# Patient Record
Sex: FEMALE | Employment: OTHER | URBAN - METROPOLITAN AREA
[De-identification: names, ages, dates, MRNs, and addresses within clinical notes are randomized per-mention and may not be internally consistent; named-entity substitution may affect disease eponyms.]

---

## 2017-02-23 PROBLEM — Z79.4 TYPE 2 DIABETES MELLITUS WITHOUT COMPLICATION, WITH LONG-TERM CURRENT USE OF INSULIN (HCC): Status: ACTIVE | Noted: 2017-02-23

## 2017-02-23 PROBLEM — E11.9 TYPE 2 DIABETES MELLITUS WITHOUT COMPLICATION, WITH LONG-TERM CURRENT USE OF INSULIN (HCC): Status: ACTIVE | Noted: 2017-02-23

## 2017-08-15 ENCOUNTER — HOSPITAL ENCOUNTER (OUTPATIENT)
Dept: MAMMOGRAPHY | Age: 72
Discharge: HOME | End: 2017-08-15
Attending: FAMILY MEDICINE
Payer: MEDICARE

## 2017-08-15 DIAGNOSIS — Z12.39 SCREENING FOR MALIGNANT NEOPLASM OF BREAST: ICD-10-CM

## 2017-08-15 PROCEDURE — 77067 SCR MAMMO BI INCL CAD: CPT

## 2018-06-26 PROBLEM — E11.65 TYPE 2 DIABETES MELLITUS WITH HYPERGLYCEMIA, WITH LONG-TERM CURRENT USE OF INSULIN (HCC): Status: ACTIVE | Noted: 2017-02-23

## 2018-08-16 ENCOUNTER — HOSPITAL ENCOUNTER (OUTPATIENT)
Dept: MAMMOGRAPHY | Age: 73
Discharge: HOME | End: 2018-08-16
Attending: FAMILY MEDICINE
Payer: MEDICARE

## 2018-08-16 DIAGNOSIS — Z12.31 VISIT FOR SCREENING MAMMOGRAM: ICD-10-CM

## 2018-08-16 PROCEDURE — 77067 SCR MAMMO BI INCL CAD: CPT

## 2019-08-28 ENCOUNTER — HOSPITAL ENCOUNTER (OUTPATIENT)
Dept: MAMMOGRAPHY | Age: 74
Discharge: HOME | End: 2019-08-28
Attending: FAMILY MEDICINE
Payer: MEDICARE

## 2019-08-28 DIAGNOSIS — Z12.31 VISIT FOR SCREENING MAMMOGRAM: ICD-10-CM

## 2019-08-28 PROCEDURE — 77067 SCR MAMMO BI INCL CAD: CPT

## 2019-11-04 ENCOUNTER — HOSPITAL ENCOUNTER (OUTPATIENT)
Dept: MAMMOGRAPHY | Age: 74
Discharge: HOME | End: 2019-11-04
Attending: FAMILY MEDICINE
Payer: MEDICARE

## 2019-11-04 DIAGNOSIS — Z78.0 POSTMENOPAUSAL STATE: ICD-10-CM

## 2019-11-04 PROCEDURE — 77080 DXA BONE DENSITY AXIAL: CPT

## 2019-11-13 ENCOUNTER — HOSPITAL ENCOUNTER (OUTPATIENT)
Dept: SURGERY | Age: 74
Discharge: HOME | End: 2019-11-13

## 2019-11-14 VITALS — BODY MASS INDEX: 27.09 KG/M2 | WEIGHT: 138 LBS | HEIGHT: 60 IN

## 2019-11-14 NOTE — PERIOP NOTES
Patient verified name, , and procedure. Type: 1a; abbreviated assessment per anesthesia guidelines  Labs per surgeon: NONE  Labs per anesthesia: NONE    Instructed pt that they will be notified by the doctor office for time of arrival to GI lab. Follow diet and prep instructions per office including NPO status. If patient has NOT received instructions from office patient is advised to call surgeon office, verbalizes understanding. Bath or shower the night before and the am of surgery with non-mositurizing soap. No lotions, oils, powders, cologne on skin. No make up, eye make up or jewelry. Wear loose fitting comfortable, clean clothing. Must have adult present in building the entire time . Medications for the day of procedure none, patient to hold none    Will take 80% or 22 units of Solostar night before surgery per [facility] protocols. The following discharge instructions reviewed with patient: medication given during procedure may cause drowsiness for several hours, therefore, do not drive or operate machinery for remainder of the day. You may not drink alcohol on the day of your procedure, please resume regular diet and activity unless otherwise directed. You may experience abdominal distention for several hours that is relieved by the passage of gas. Contact your physician if you have any of the following: fever or chills, severe abdominal pain or excessive amount of bleeding or a large amount when having a bowel movement.  Occasional specks of blood with bowel movement would not be unusual.

## 2019-11-19 ENCOUNTER — ANESTHESIA EVENT (OUTPATIENT)
Dept: ENDOSCOPY | Age: 74
End: 2019-11-19
Payer: MEDICARE

## 2019-11-19 NOTE — ANESTHESIA PREPROCEDURE EVALUATION
Relevant Problems   No relevant active problems       Anesthetic History               Review of Systems / Medical History  Patient summary reviewed, nursing notes reviewed and pertinent labs reviewed    Pulmonary                   Neuro/Psych              Cardiovascular    Hypertension              Exercise tolerance: >4 METS     GI/Hepatic/Renal                Endo/Other    Diabetes: type 2, using insulin         Other Findings              Physical Exam    Airway  Mallampati: II  TM Distance: 4 - 6 cm  Neck ROM: decreased range of motion   Mouth opening: Normal     Cardiovascular    Rhythm: regular  Rate: normal    Murmur: Grade 3, Mitral area     Dental    Dentition: Lower partial plate and Upper partial plate     Pulmonary  Breath sounds clear to auscultation               Abdominal  GI exam deferred       Other Findings            Anesthetic Plan    ASA: 3  Anesthesia type: total IV anesthesia            Anesthetic plan and risks discussed with: Patient    Spouse and daughter present

## 2019-11-20 ENCOUNTER — HOSPITAL ENCOUNTER (OUTPATIENT)
Age: 74
Setting detail: OUTPATIENT SURGERY
Discharge: HOME | End: 2019-11-20
Attending: SURGERY | Admitting: SURGERY
Payer: MEDICARE

## 2019-11-20 ENCOUNTER — ANESTHESIA (OUTPATIENT)
Dept: ENDOSCOPY | Age: 74
End: 2019-11-20
Payer: MEDICARE

## 2019-11-20 VITALS
OXYGEN SATURATION: 97 % | DIASTOLIC BLOOD PRESSURE: 55 MMHG | HEART RATE: 52 BPM | RESPIRATION RATE: 14 BRPM | SYSTOLIC BLOOD PRESSURE: 110 MMHG

## 2019-11-20 LAB — GLUCOSE BLD STRIP.AUTO-MCNC: 146 MG/DL (ref 65–100)

## 2019-11-20 PROCEDURE — 74011250636 HC RX REV CODE- 250/636: Performed by: NURSE ANESTHETIST, CERTIFIED REGISTERED

## 2019-11-20 PROCEDURE — 74011250636 HC RX REV CODE- 250/636: Performed by: ANESTHESIOLOGY

## 2019-11-20 PROCEDURE — 88305 TISSUE EXAM BY PATHOLOGIST: CPT

## 2019-11-20 PROCEDURE — 76060000032 HC ANESTHESIA 0.5 TO 1 HR: Performed by: SURGERY

## 2019-11-20 PROCEDURE — 82962 GLUCOSE BLOOD TEST: CPT

## 2019-11-20 PROCEDURE — 77030009426 HC FCPS BIOP ENDOSC BSC -B: Performed by: SURGERY

## 2019-11-20 PROCEDURE — 77030013991 HC SNR POLYP ENDOSC BSC -A: Performed by: SURGERY

## 2019-11-20 PROCEDURE — 76040000026: Performed by: SURGERY

## 2019-11-20 RX ORDER — SODIUM CHLORIDE 0.9 % (FLUSH) 0.9 %
5-40 SYRINGE (ML) INJECTION AS NEEDED
Status: DISCONTINUED | OUTPATIENT
Start: 2019-11-20 | End: 2019-11-20 | Stop reason: HOSPADM

## 2019-11-20 RX ORDER — SODIUM CHLORIDE, SODIUM LACTATE, POTASSIUM CHLORIDE, CALCIUM CHLORIDE 600; 310; 30; 20 MG/100ML; MG/100ML; MG/100ML; MG/100ML
100 INJECTION, SOLUTION INTRAVENOUS CONTINUOUS
Status: DISCONTINUED | OUTPATIENT
Start: 2019-11-20 | End: 2019-11-20 | Stop reason: HOSPADM

## 2019-11-20 RX ORDER — PROPOFOL 10 MG/ML
INJECTION, EMULSION INTRAVENOUS AS NEEDED
Status: DISCONTINUED | OUTPATIENT
Start: 2019-11-20 | End: 2019-11-20 | Stop reason: HOSPADM

## 2019-11-20 RX ORDER — SODIUM CHLORIDE 0.9 % (FLUSH) 0.9 %
5-40 SYRINGE (ML) INJECTION EVERY 8 HOURS
Status: DISCONTINUED | OUTPATIENT
Start: 2019-11-20 | End: 2019-11-20 | Stop reason: HOSPADM

## 2019-11-20 RX ORDER — PROPOFOL 10 MG/ML
INJECTION, EMULSION INTRAVENOUS
Status: DISCONTINUED | OUTPATIENT
Start: 2019-11-20 | End: 2019-11-20 | Stop reason: HOSPADM

## 2019-11-20 RX ADMIN — PROPOFOL 80 MG: 10 INJECTION, EMULSION INTRAVENOUS at 12:30

## 2019-11-20 RX ADMIN — PROPOFOL 180 MCG/KG/MIN: 10 INJECTION, EMULSION INTRAVENOUS at 12:30

## 2019-11-20 RX ADMIN — SODIUM CHLORIDE, SODIUM LACTATE, POTASSIUM CHLORIDE, AND CALCIUM CHLORIDE: 600; 310; 30; 20 INJECTION, SOLUTION INTRAVENOUS at 12:25

## 2019-11-20 NOTE — DISCHARGE INSTRUCTIONS
Gastrointestinal Colonoscopy/Flexible Sigmoidoscopy  Lower Exam Discharge Instructions      1. Call your doctor for any problems or questions. Amina Channel a castrejon doctor para cualquier problema  ó preguntas  2. Contact the doctors office for follow up appointment as directed  Contacte a la oficina del doctor para hacer nish visita de seguimiento omero se lo indicaron. 3. Medication may cause drowsiness for several hours, therefore, do not drive or operate machinery for remainder of the day. [employer] puede causar sueño por varias horas, por lo tanto no conducir y tampoco operar maquinaria el flori del día. 4. No alcohol today. No consumir bebidas alcoholicas. 5. Ordinarily, you may resume regular diet and activity after exam unless otherwise specified by your physician. Puede regresar a castrejon dieta regular despues del procedimiento al [employer] doctor se lo haya especificado. 6. Because of air put into your colon during exam, you may experience some abdominal distension, relieved by the passage of gas, for several hours. Debido al aire que pusimos en el colon para el procedimiento, usted experimentará el abdomen extendido para aliviar [place] problema debe pasar los gases por varias horas. 7. Contact your physician if you have any of the following:  Comuniquese con el doctor si presenta estos sintomas. a. Excessive amount of bleeding - large amount when having a bowel movement. Occasional specks of blood with bowel movement would not be unusual.  Sangrado excesivo cuando va al baño. No sería inusual si tiene manchas de suzette con la defecación.  b. Severe abdominal pain  Dolor abdominal severo  c.  Fever or Chills  Tammy Severino ó escalofrios

## 2019-11-20 NOTE — H&P
History and Physical      Patient: Kristan Britt    Physician: Shea Chew MD    Referring Physician: Geovanni Bond MD    Chief Complaint: For colonoscopy    History of Present Illness: Pt presents for colonoscopy. Per , previous exam was 3767 but family uncertain of presence of polyps. 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, implant or graft     premacular fibrosis    History of bilateral cataract extraction     History of glaucoma     Hyperlipidemia     Hypertension      Past Surgical History:   Procedure Laterality Date    HX APPENDECTOMY      HX CATARACT REMOVAL      HX  SECTION      HX OTHER SURGICAL      chronic drop therapy    HX REFRACTIVE SURGERY        Social History     Socioeconomic History    Marital status:      Spouse name: Not on file    Number of children: Not on file    Years of education: Not on file    Highest education level: Not on file   Tobacco Use    Smoking status: Never Smoker    Smokeless tobacco: Never Used   Substance and Sexual Activity    Alcohol use: No     Alcohol/week: 0.0 standard drinks    Drug use: No   Other Topics Concern    Seat Belt Yes   Social History Narrative    Denies physical or sexual abuse      Family History   Problem Relation Age of Onset    Heart Disease Mother     Diabetes Father     Other Brother         bone disease    Heart Attack Brother     Breast Cancer Neg Hx        Medications:   Prior to Admission medications    Medication Sig Start Date End Date Taking? Authorizing Provider   insulin glargine (LANTUS SOLOSTAR U-100 INSULIN) 100 unit/mL (3 mL) inpn 30 Units by SubCUTAneous route daily.  19   Geovanni Bond MD   insulin aspart U-100 (NOVOLOG FLEXPEN U-100 INSULIN) 100 unit/mL (3 mL) inpn INYECTAR 8 UNIDADES POR VIA SUBCUTANEA EN [facility] Y 16 UNIDADES CON LA ROSIE 11/14/19   Garry Williamosn MD   lancets (PRODIGY LANCETS) 28 gauge misc Use to test blood sugar daily. Dx: DM2, E11.9  Indications: type 2 diabetes mellitus 7/25/19   Garry Williamson MD   dulaglutide (TRULICITY) 1.5 ER/8.4 mL sub-q pen 0.5 mL by SubCUTAneous route every seven (7) days. 7/3/19   Garry Williamson MD   simvastatin (ZOCOR) 20 mg tablet [facility] [address]  Patient taking differently: every evening. 7/1/19   Garry Williamson MD   losartan-hydroCHLOROthiazide [facility]) 100-25 mg per tablet [facility] [address] [facility]  Patient taking differently: Take 1 Tab by mouth daily. 7/1/19   Garry Williamson MD   metFORMIN (GLUCOPHAGE) 1,000 mg tablet TAKE ONE TABLET BY MOUTH TWICE A DAY WITH MEALS 7/1/19   Garry Williamson MD   ketoconazole (NIZORAL) 2 % shampoo Apply 120 mL to affected area two (2) days a week for 90 days. 1/19/19   Garry Williamson MD   Blood-Glucose Meter (ACCU-CHEK JASWANT PLUS METER) misc USE TO CHECK BLOOD SUGAR LEVEL TID/DX E11.9 10/1/18   Garry Williamson MD   ACCU-CHEK JASWANT CONTROL SOLN soln Use to test blood sugar daily. Dx: DM2, E11.9 10/1/18   Garry Williamson MD   glucose blood VI test strips (ACCU-CHEK JASWANT PLUS TEST STRP) strip Use to use test blood sugars three times daily. 10/1/18   Garry Williamson MD   alcohol swabs (BD SINGLE USE SWABS REGULAR) padm Use to test blood sugar daily. Dx: DM2, E11.9 7/20/16   Garry Williamson MD   aspirin delayed-release 81 mg tablet Take 81 mg by mouth daily. Indications: MYOCARDIAL INFARCTION PREVENTION    Provider, Historical   Calcium-Cholecalciferol, D3, (CALCIUM 600 WITH VITAMIN D3) 600 mg(1,500mg) -400 unit cap Take  by mouth. Provider, Historical   Insulin Needles, Disposable, (NOVOFINE 32) 32 x 1/4 \" ndle Use to test blood sugar daily. Dx: DM2, E11.9    Provider, Historical       Allergies: No Known Allergies    Physical Exam:     Vital Signs:  There were no vitals taken for this visit.  .    General: no distress      Heart: regular   Lungs: unlabored   Abdominal: soft   Neurological: Grossly normal        Findings/Diagnosis: Screening for colorectal cancer     Plan of Care/Planned Procedure: Colonoscopy, possible polypectomy. Pt/designee has reviewed the colonoscopy information sheet. Any questions have been discussed. They agree to proceed.       Signed:  Marcela Gimenez MD   11/20/2019

## 2019-11-20 NOTE — PROCEDURES
Procedure in Detail:  Informed consent was obtained for the procedure. The patient was placed in the left lateral decubitus position and sedation was induced by anesthesia. The scope was inserted into the rectum and advanced under direct vision to the cecum, which was identified by the ileocecal valve and appendiceal orifice. The quality of the colonic preparation was adequate- there was copious residual slurry-type material requiring lavage, and distension was not ideal in addition to redundancy of the sigmoid. A careful inspection was made as the colonoscope was withdrawn, including a retroflexed view of the rectum; findings and interventions are described below. Appropriate photodocumentation was obtained. Findings:   Rectum:   Normal  Sigmoid:     - Protruding lesions:     -bilobed 5mm and sessile rectosigmoid lesion Polyp(s) size 4 mm removed by polypectomy (snare cautery) and polypectomy (hot biopsy)  Descending Colon:   Normal  Transverse Colon:   Normal  Ascending Colon:   Normal  Cecum:     - Protruding lesions:     -Sessile Polyp(s) size 3 mm removed by polypectomy (hot biopsy)          Specimens: Specimens were collected and sent to pathology. Complications: None; patient tolerated the procedure well. \    EBL - none    Recommendations:   - Await pathology.      Signed By: Celi Larose MD                        November 20, 2019

## 2019-11-20 NOTE — ANESTHESIA POSTPROCEDURE EVALUATION
Procedure(s):  COLONOSCOPY/ BMI 25   ENDOSCOPIC POLYPECTOMY.     total IV anesthesia    Anesthesia Post Evaluation        Patient location during evaluation: PACU  Patient participation: complete - patient participated  Level of consciousness: responsive to verbal stimuli  Pain management: adequate  Airway patency: patent  Anesthetic complications: no  Cardiovascular status: acceptable  Respiratory status: acceptable  Hydration status: euvolemic        Vitals Value Taken Time   /55 11/20/2019  1:20 PM   Temp     Pulse 51 11/20/2019  1:20 PM   Resp 14 11/20/2019  1:20 PM   SpO2 98 % 11/20/2019  1:20 PM

## 2019-11-20 NOTE — ROUTINE PROCESS
Patient refused hospital . Present for Signing of waiver with nurse. Daughter will be interpreting. Brandie Prakash CH[employer]//  Patient Relations & Interpreting Services 
p: [phone] / [email or web address]

## 2019-11-26 NOTE — PROGRESS NOTES
Pleasant surprise- all 3 polyps insignificant.   No further screening based on age  plz call to daughter Elizabet Force [phone]

## 2022-03-19 PROBLEM — E11.65 TYPE 2 DIABETES MELLITUS WITH HYPERGLYCEMIA, WITH LONG-TERM CURRENT USE OF INSULIN (HCC): Status: ACTIVE | Noted: 2017-02-23

## 2022-03-19 PROBLEM — Z79.4 TYPE 2 DIABETES MELLITUS WITH HYPERGLYCEMIA, WITH LONG-TERM CURRENT USE OF INSULIN (HCC): Status: ACTIVE | Noted: 2017-02-23

## 2022-04-11 ENCOUNTER — HOSPITAL ENCOUNTER (OUTPATIENT)
Dept: CT IMAGING | Age: 77
Discharge: HOME | End: 2022-04-11
Attending: FAMILY MEDICINE
Payer: MEDICARE

## 2022-04-11 ENCOUNTER — HOSPITAL ENCOUNTER (OUTPATIENT)
Dept: ULTRASOUND IMAGING | Age: 77
Discharge: HOME | End: 2022-04-11
Attending: FAMILY MEDICINE
Payer: MEDICARE

## 2022-04-11 DIAGNOSIS — R31.9 HEMATURIA, UNSPECIFIED TYPE: ICD-10-CM

## 2022-04-11 PROCEDURE — 74176 CT ABD & PELVIS W/O CONTRAST: CPT

## 2022-04-11 PROCEDURE — 76770 US EXAM ABDO BACK WALL COMP: CPT

## 2022-04-20 ENCOUNTER — TRANSCRIBE ORDER (OUTPATIENT)
Dept: SCHEDULING | Age: 77
End: 2022-04-20

## 2022-04-20 DIAGNOSIS — Z12.31 OTHER SCREENING MAMMOGRAM: Primary | ICD-10-CM

## 2022-05-16 ENCOUNTER — HOSPITAL ENCOUNTER (OUTPATIENT)
Dept: MAMMOGRAPHY | Age: 77
Discharge: HOME | End: 2022-05-16
Attending: FAMILY MEDICINE
Payer: MEDICARE

## 2022-05-16 DIAGNOSIS — Z13.820 SCREENING FOR OSTEOPOROSIS: ICD-10-CM

## 2022-05-16 DIAGNOSIS — Z78.0 POSTMENOPAUSAL: ICD-10-CM

## 2022-05-16 DIAGNOSIS — Z12.31 OTHER SCREENING MAMMOGRAM: ICD-10-CM

## 2022-05-16 PROCEDURE — 77067 SCR MAMMO BI INCL CAD: CPT

## 2022-05-16 PROCEDURE — 77080 DXA BONE DENSITY AXIAL: CPT

## 2022-06-03 ENCOUNTER — TELEPHONE (OUTPATIENT)
Dept: UROLOGY | Age: 77
End: 2022-06-03

## 2022-06-03 ENCOUNTER — OFFICE VISIT (OUTPATIENT)
Dept: UROLOGY | Age: 77
End: 2022-06-03
Payer: MEDICARE

## 2022-06-03 DIAGNOSIS — N39.0 RECURRENT UTI: Primary | ICD-10-CM

## 2022-06-03 LAB
BILIRUBIN, URINE, POC: NEGATIVE
BLOOD URINE, POC: NORMAL
GLUCOSE URINE, POC: NEGATIVE
KETONES, URINE, POC: NEGATIVE
LEUKOCYTE ESTERASE, URINE, POC: NEGATIVE
NITRITE, URINE, POC: NEGATIVE
PH, URINE, POC: 7 (ref 4.6–8)
PROTEIN,URINE, POC: NEGATIVE
SPECIFIC GRAVITY, URINE, POC: 1.02 (ref 1–1.03)
URINALYSIS CLARITY, POC: NORMAL
URINALYSIS COLOR, POC: NORMAL
UROBILINOGEN, POC: NORMAL

## 2022-06-03 PROCEDURE — 81003 URINALYSIS AUTO W/O SCOPE: CPT | Performed by: UROLOGY

## 2022-06-03 NOTE — PROGRESS NOTES
Reason for collection: recheck  Patient #: [phone]  Pharmacy: [address]              UA - Dipstick  Results for orders placed or performed in visit on 06/03/22   AMB POC URINALYSIS DIP STICK AUTO W/O MICRO   Result Value Ref Range    Color (UA POC)      Clarity (UA POC)      Glucose, Urine, POC Negative Negative    Bilirubin, Urine, POC Negative Negative    KETONES, Urine, POC Negative Negative    Specific Gravity, Urine, POC 1.020 1.001 - 1.035    Blood (UA POC) Large Negative    pH, Urine, POC 7.0 4.6 - 8.0    Protein, Urine, POC Negative Negative    Urobilinogen, POC 0.2 mg/dL     Nitrite, Urine, POC Negative Negative    Leukocyte Esterase, Urine, POC Negative Negative       UA - Micro  WBC - 0  RBC -25-30  Bacteria - 0  Epith - 0      Requested Prescriptions      No prescriptions requested or ordered in this encounter     Plan    Diagnosis Orders   1. Recurrent UTI  AMB POC URINALYSIS DIP STICK AUTO W/O MICRO     Had Staph UTI on 5-13-22. Not sure she ever got abx. Still has microhematuria   Needs cysto. Can use cysto, trus or vas slot.

## 2022-06-07 ENCOUNTER — TELEPHONE (OUTPATIENT)
Dept: FAMILY MEDICINE CLINIC | Facility: CLINIC | Age: 77
End: 2022-06-07

## 2022-06-07 NOTE — TELEPHONE ENCOUNTER
Pt. Has come to the office to req. A referral to be sent to a Dr. Sonia Cook (urologist with [facility]) instead of previous physician.       Please call patient with the status of the referral

## 2022-06-08 ENCOUNTER — TELEPHONE (OUTPATIENT)
Dept: FAMILY MEDICINE CLINIC | Facility: CLINIC | Age: 77
End: 2022-06-08

## 2022-06-08 DIAGNOSIS — R31.9 HEMATURIA, UNSPECIFIED TYPE: Primary | ICD-10-CM

## 2022-06-08 NOTE — TELEPHONE ENCOUNTER
Patient does not want to see a [facility] urologist wants to go to [facility]

## 2022-06-20 NOTE — TELEPHONE ENCOUNTER
Daughter called back and states pt's  has appt with Bárbara Dowell today so he will schedule her procedure when he comes in.

## 2022-06-27 ENCOUNTER — PROCEDURE VISIT (OUTPATIENT)
Dept: UROLOGY | Age: 77
End: 2022-06-27
Payer: MEDICARE

## 2022-06-27 DIAGNOSIS — N39.0 RECURRENT UTI: ICD-10-CM

## 2022-06-27 DIAGNOSIS — R31.29 MICROHEMATURIA: Primary | ICD-10-CM

## 2022-06-27 LAB
BILIRUBIN, URINE, POC: NEGATIVE
BLOOD URINE, POC: NEGATIVE
GLUCOSE URINE, POC: NEGATIVE
KETONES, URINE, POC: NEGATIVE
LEUKOCYTE ESTERASE, URINE, POC: NEGATIVE
NITRITE, URINE, POC: NEGATIVE
PH, URINE, POC: 6 (ref 4.6–8)
PROTEIN,URINE, POC: NEGATIVE
SPECIFIC GRAVITY, URINE, POC: 1.02 (ref 1–1.03)
URINALYSIS CLARITY, POC: NORMAL
URINALYSIS COLOR, POC: NORMAL
UROBILINOGEN, POC: NORMAL

## 2022-06-27 PROCEDURE — 52000 CYSTOURETHROSCOPY: CPT | Performed by: UROLOGY

## 2022-06-27 PROCEDURE — 81003 URINALYSIS AUTO W/O SCOPE: CPT | Performed by: UROLOGY

## 2022-06-27 NOTE — LETTER
[facility] UROLOGY  [address] [place] [address]  Phone: [phone]  Fax: [phone]    Mathew Meneses MD    June 27, 2022     Haresh Lan DO  [address] [place] [address]    Patient: Artis Ornelas   MR Number: [id number]   YOB: 1945   Date of Visit: 6/27/2022       Dear Haresh Lan:    Thank you for referring Artis Ornelas to me for evaluation/treatment. Below are the relevant portions of my assessment and plan of care. If you have questions, please do not hesitate to call me. I look forward to following Rubi Stroud along with you.     Sincerely,      Mathew Meneses MD

## 2022-06-27 NOTE — PROGRESS NOTES
[facility]  [address] [facility]   [address]  [address] [facility], [address] [id number]    [facility]  : 1945         HPI   76 y.o., female referred by Dr. Anisha Felipe for evaluation and treatment of hematuria. UA on 3-25-22 showed 2+ blood. Urine culture showed no growth. CT urogram 22: IMPRESSION  1. Constipation. No bowel obstruction or diverticulitis. Appendix not  visualized. 2. No urinary tract calculi or hydronephrosis.  service used today (Swedish). Pt denies dysuria.  has seen her have gross hematuria 2 or 3 times. Pt denies flank pain or abdomen. Renal US 22:    IMPRESSION  Unremarkable renal ultrasound. No hydronephrosis or obvious renal  calculi. Incidental fatty liver infiltration.   pt is nonsmoker.    staph aureus UTI when seen on 22. Still had microhematuria when seen on 6-3-22. Here for cysto.       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, implant or graft     premacular fibrosis    History of bilateral cataract extraction     History of glaucoma     Hyperlipidemia     Hypertension      Past Surgical History:   Procedure Laterality Date    APPENDECTOMY      CATARACT REMOVAL       SECTION      COLONOSCOPY N/A 2019    COLONOSCOPY/ BMI 25  performed by Kasi Palacios [facility] at [facility] 40 FLX W/RMVL OF TUMOR POLYP LESION SNARE TQ  2019         OTHER SURGICAL HISTORY      chronic drop therapy    REFRACTIVE SURGERY       Current Outpatient Medications   Medication Sig Dispense Refill    aspirin 81 MG EC tablet Take 81 mg by mouth daily      Calcium Carb-Cholecalciferol 600-400 MG-UNIT CAPS 1 p.o. daily      Dulaglutide (TRULICITY) 1.5 UI/5.1YM SOPN Inject 1.5 mg into the skin every 7 days      insulin aspart (NOVOLOG) 100 UNIT/ML injection pen Patient will do 8 units in the morning with breakfast 12 units at lunch and 20 units with dinner      insulin glargine (LANTUS SOLOSTAR) 100 UNIT/ML injection pen Inject 50 Units into the skin      ketoconazole (NIZORAL) 2 % shampoo Apply 120 mLs topically      losartan-hydroCHLOROthiazide (HYZAAR) 100-25 MG per tablet TOME 1 TABLETA DIARIAMENTE      metFORMIN (GLUCOPHAGE) 1000 MG tablet TAKE ONE TABLET BY MOUTH TWICE A DAY WITH MEALS      simvastatin (ZOCOR) 20 MG tablet TOME 1 TABLETA CADA NOCHE PARA HIPERCOLESTEROLEMIA       No current facility-administered medications for this visit. No Known Allergies  Social History     Socioeconomic History    Marital status:      Spouse name: Not on file    Number of children: Not on file    Years of education: Not on file    Highest education level: Not on file   Occupational History    Not on file   Tobacco Use    Smoking status: Never Smoker    Smokeless tobacco: Never Used   Substance and Sexual Activity    Alcohol use: No     Alcohol/week: 0.0 standard drinks    Drug use: No    Sexual activity: Not on file   Other Topics Concern    Not on file   Social History Narrative    Denies physical or sexual abuse     Social Determinants of Health     Financial Resource Strain:     Difficulty of Paying Living Expenses: Not on file   Food Insecurity:     Worried About Running Out of Food in the Last Year: Not on file    [place] of Food in the Last Year: Not on file   Transportation Needs:     Lack of Transportation (Medical): Not on file    Lack of Transportation (Non-Medical):  Not on file   Physical Activity:     Days of Exercise per Week: Not on file    Minutes of Exercise per Session: Not on file   Stress:     Feeling of Stress : Not on file   Social Connections:     Frequency of Communication with Friends and Family: Not on file    Frequency of Social Gatherings with Friends and Family: Not on file    Attends Rastafari Services: Not on file    Active Member of Clubs or Organizations: Not on file    Attends Club or Organization Meetings: Not on file    Marital Status: Not on file   Intimate Partner Violence:     Fear of Current or Ex-Partner: Not on file    Emotionally Abused: Not on file    Physically Abused: Not on file    Sexually Abused: Not on file   Housing Stability:     Unable to Pay for Housing in the Last Year: Not on file    Number of [place] in the Last Year: Not on file    Unstable Housing in the Last Year: Not on file     Family History   Problem Relation Age of Onset    Heart Disease Mother     Diabetes Mother     Diabetes Father     Other Brother         bone disease    Heart Attack Brother     Breast Cancer Neg Hx        There were no vitals taken for this visit. UA - Dipstick  Results for orders placed or performed in visit on 06/27/22   AMB POC URINALYSIS DIP STICK AUTO W/O MICRO   Result Value Ref Range    Color (UA POC)      Clarity (UA POC)      Glucose, Urine, POC Negative Negative    Bilirubin, Urine, POC Negative Negative    KETONES, Urine, POC Negative Negative    Specific Gravity, Urine, POC 1.020 1.001 - 1.035    Blood (UA POC) Negative Negative    pH, Urine, POC 6.0 4.6 - 8.0    Protein, Urine, POC Negative Negative    Urobilinogen, POC Normal     Nitrite, Urine, POC Negative Negative    Leukocyte Esterase, Urine, POC Negative Negative       UA - Micro  WBC - 0  RBC - 0  Bacteria - 0  Epith - 0      Cystoscopy Procedure:     Procedure Room  3  Scope ID:       dispo  Assistant:      sarah    All risks, benefits and alternatives were again reviewed with patient and she is willing to proceed at this time. Her genital area was prepped and draped and a sterile field applied. 2% lidocaine jelly was injected in the the urethra and allowed to dwell for several minutes. A flexible cystoscope was then inserted into the urethral meatus and advanced under direct vision.   The urethra appeared normal with no obstructions. The bladder neck was open without scarring, contractures, frons or tumors present. The bladder was systematically surveyed. No bladder trabeculations were seen. No mucosal abnormalities were seen. The ureteral orifices were seen in their normal orthotopic position. The cystoscope was then removed under direct vision. The patient tolerated the procedure well. Assessment and Plan    ICD-10-CM    1. Microhematuria  R31.29 CYSTOURETHROSCOPY     AMB POC URINALYSIS DIP STICK AUTO W/O MICRO   2. Recurrent UTI  N39.0      Orders Placed This Encounter   Procedures    CYSTOURETHROSCOPY    AMB POC URINALYSIS DIP STICK AUTO W/O MICRO     Hematuria resolved. May have been due to UTI. Return if symptoms worsen or fail to improve.

## 2022-09-16 ENCOUNTER — NURSE ONLY (OUTPATIENT)
Dept: FAMILY MEDICINE CLINIC | Facility: CLINIC | Age: 77
End: 2022-09-16
Payer: MEDICARE

## 2022-09-16 DIAGNOSIS — Z79.4 TYPE 2 DIABETES MELLITUS WITH HYPERGLYCEMIA, WITH LONG-TERM CURRENT USE OF INSULIN (HCC): Primary | ICD-10-CM

## 2022-09-16 DIAGNOSIS — I10 ESSENTIAL HYPERTENSION WITH GOAL BLOOD PRESSURE LESS THAN 140/90: ICD-10-CM

## 2022-09-16 DIAGNOSIS — E78.00 PURE HYPERCHOLESTEROLEMIA: ICD-10-CM

## 2022-09-16 DIAGNOSIS — E11.65 TYPE 2 DIABETES MELLITUS WITH HYPERGLYCEMIA, WITH LONG-TERM CURRENT USE OF INSULIN (HCC): Primary | ICD-10-CM

## 2022-09-16 LAB
ALBUMIN SERPL-MCNC: 4 G/DL (ref 3.2–4.6)
ALBUMIN/GLOB SERPL: 1.2 {RATIO} (ref 1.2–3.5)
ALP SERPL-CCNC: 67 U/L (ref 50–136)
ALT SERPL-CCNC: 46 U/L (ref 12–65)
ANION GAP SERPL CALC-SCNC: 5 MMOL/L (ref 4–13)
AST SERPL-CCNC: 26 U/L (ref 15–37)
BILIRUB SERPL-MCNC: 0.4 MG/DL (ref 0.2–1.1)
BUN SERPL-MCNC: 22 MG/DL (ref 8–23)
CALCIUM SERPL-MCNC: 10.1 MG/DL (ref 8.3–10.4)
CHLORIDE SERPL-SCNC: 100 MMOL/L (ref 101–110)
CHOLEST SERPL-MCNC: 142 MG/DL
CO2 SERPL-SCNC: 31 MMOL/L (ref 21–32)
CREAT SERPL-MCNC: 0.6 MG/DL (ref 0.6–1)
EST. AVERAGE GLUCOSE BLD GHB EST-MCNC: 163 MG/DL
GLOBULIN SER CALC-MCNC: 3.4 G/DL (ref 2.3–3.5)
GLUCOSE SERPL-MCNC: 121 MG/DL (ref 65–100)
HBA1C MFR BLD: 7.3 % (ref 4.8–5.6)
HDLC SERPL-MCNC: 59 MG/DL (ref 40–60)
HDLC SERPL: 2.4 {RATIO}
LDLC SERPL CALC-MCNC: 42.8 MG/DL
POTASSIUM SERPL-SCNC: 3.9 MMOL/L (ref 3.5–5.1)
PROT SERPL-MCNC: 7.4 G/DL (ref 6.3–8.2)
SODIUM SERPL-SCNC: 136 MMOL/L (ref 136–145)
TRIGL SERPL-MCNC: 201 MG/DL (ref 35–150)
VLDLC SERPL CALC-MCNC: 40.2 MG/DL (ref 6–23)

## 2022-09-16 PROCEDURE — 36415 COLL VENOUS BLD VENIPUNCTURE: CPT | Performed by: FAMILY MEDICINE

## 2022-09-22 ENCOUNTER — TELEMEDICINE (OUTPATIENT)
Dept: FAMILY MEDICINE CLINIC | Facility: CLINIC | Age: 77
End: 2022-09-22
Payer: MEDICARE

## 2022-09-22 DIAGNOSIS — Z00.00 ROUTINE GENERAL MEDICAL EXAMINATION AT A HEALTH CARE FACILITY: ICD-10-CM

## 2022-09-22 DIAGNOSIS — Z79.4 TYPE 2 DIABETES MELLITUS WITH HYPERGLYCEMIA, WITH LONG-TERM CURRENT USE OF INSULIN (HCC): Primary | ICD-10-CM

## 2022-09-22 DIAGNOSIS — E11.65 TYPE 2 DIABETES MELLITUS WITH HYPERGLYCEMIA, WITH LONG-TERM CURRENT USE OF INSULIN (HCC): Primary | ICD-10-CM

## 2022-09-22 PROCEDURE — 99442 PR PHYS/QHP TELEPHONE EVALUATION 11-20 MIN: CPT | Performed by: FAMILY MEDICINE

## 2022-09-22 RX ORDER — DULAGLUTIDE 1.5 MG/.5ML
1.5 INJECTION, SOLUTION SUBCUTANEOUS
Qty: 12 ADJUSTABLE DOSE PRE-FILLED PEN SYRINGE | Refills: 5 | Status: SHIPPED | OUTPATIENT
Start: 2022-09-22

## 2022-09-22 RX ORDER — INSULIN GLARGINE 100 [IU]/ML
50 INJECTION, SOLUTION SUBCUTANEOUS NIGHTLY
Qty: 5 ADJUSTABLE DOSE PRE-FILLED PEN SYRINGE | Refills: 5 | Status: SHIPPED | OUTPATIENT
Start: 2022-09-22

## 2022-09-22 ASSESSMENT — ENCOUNTER SYMPTOMS
VOMITING: 0
SHORTNESS OF BREATH: 0
NAUSEA: 0

## 2022-09-22 NOTE — PROGRESS NOTES
PROGRESS NOTE  This visit was conducted via the phone with patient's consent to the visit and all associated charges to reduce the patient's risk of exposure to COVID-19 and continuity of care for an established patient. She and/or her healthcare decision maker is aware that this patient-initiated phone encounter is a billable service, with coverage as determined by her insurance carrier. She is aware that she may receive a bill and has provided verbal consent to proceed: Yes    Vitals and physical exam deferred due to telephone visit. Total Time: minutes: 11-20 minutes. SUBJECTIVE:   Lexis Salmeron is a 76 y.o. female seen for a follow up visit regarding the following chief complaint:           HPI patient is doing a phone call visit to go over lab results and refill prescriptions without any new complaints      Past Medical History, Past Surgical History, Family history, Social History, and Medications were all reviewed with the patient today and updated as necessary.        Current Outpatient Medications   Medication Sig Dispense Refill    Dulaglutide (TRULICITY) 1.5 JV/5.0EO SOPN Inject 1.5 mg into the skin every 7 days 12 Adjustable Dose Pre-filled Pen Syringe 5    insulin aspart (NOVOLOG) 100 UNIT/ML injection pen Patient will do 8 units in the morning with breakfast 12 units at lunch and 20 units with dinner 5 Adjustable Dose Pre-filled Pen Syringe 5    insulin glargine (LANTUS SOLOSTAR) 100 UNIT/ML injection pen Inject 50 Units into the skin nightly 5 Adjustable Dose Pre-filled Pen Syringe 5    aspirin 81 MG EC tablet Take 81 mg by mouth daily      Calcium Carb-Cholecalciferol 600-400 MG-UNIT CAPS 1 p.o. daily      ketoconazole (NIZORAL) 2 % shampoo Apply 120 mLs topically      losartan-hydroCHLOROthiazide (HYZAAR) 100-25 MG per tablet TOME 1 TABLETA DIARIAMENTE      metFORMIN (GLUCOPHAGE) 1000 MG tablet TAKE ONE TABLET BY MOUTH TWICE A DAY WITH MEALS      simvastatin (ZOCOR) 20 MG tablet TOME 1 CAROL VAZQUEZ [address] [place] [address]       No current facility-administered medications for this visit. 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, implant or graft     premacular fibrosis    History of bilateral cataract extraction     History of glaucoma     Hyperlipidemia     Hypertension      Past Surgical History:   Procedure Laterality Date    APPENDECTOMY      CATARACT REMOVAL       SECTION      COLONOSCOPY N/A 2019    COLONOSCOPY/ BMI 22  performed by Chava Lainez MD at [address] [place] [address] FLX W/RMVL OF TUMOR POLYP LESION SNARE TQ  2019         OTHER SURGICAL HISTORY      chronic drop therapy    REFRACTIVE SURGERY       Family History   Problem Relation Age of Onset    Heart Disease Mother     Diabetes Mother     Diabetes Father     Other Brother         bone disease    Heart Attack Brother     Breast Cancer Neg Hx      Social History     Tobacco Use    Smoking status: Never    Smokeless tobacco: Never   Substance Use Topics    Alcohol use: No     Alcohol/week: 0.0 standard drinks         Review of Systems   Constitutional:  Negative for fatigue and fever. Respiratory:  Negative for shortness of breath. Cardiovascular:  Negative for chest pain. Gastrointestinal:  Negative for nausea and vomiting. OBJECTIVE:  There were no vitals taken for this visit. Physical Exam     Medical problems and test results were reviewed with the patient today.      Recent Results (from the past 672 hour(s))   Hemoglobin A1C    Collection Time: 22 11:02 AM   Result Value Ref Range    Hemoglobin A1C 7.3 (H) 4.8 - 5.6 %    eAG 163 mg/dL   Comprehensive Metabolic Panel    Collection Time: 09/16/22 11:02 AM   Result Value Ref Range    Sodium 136 136 - 145 mmol/L    Potassium 3.9 3.5 - 5.1 mmol/L    Chloride 100 (L) 101 - 110 mmol/L    CO2 31 21 - 32 mmol/L    Anion Gap 5 4 - 13 mmol/L    Glucose 121 (H) 65 - 100 mg/dL    BUN 22 8 - 23 MG/DL    Creatinine 0.60 0.6 - 1.0 MG/DL    GFR African American >60 >60 ml/min/1.73m2    GFR Non- >60 >60 ml/min/1.73m2    Calcium 10.1 8.3 - 10.4 MG/DL    Total Bilirubin 0.4 0.2 - 1.1 MG/DL    ALT 46 12 - 65 U/L    AST 26 15 - 37 U/L    Alk Phosphatase 67 50 - 136 U/L    Total Protein 7.4 6.3 - 8.2 g/dL    Albumin 4.0 3.2 - 4.6 g/dL    Globulin 3.4 2.3 - 3.5 g/dL    Albumin/Globulin Ratio 1.2 1.2 - 3.5     Lipid Panel    Collection Time: 09/16/22 11:02 AM   Result Value Ref Range    Cholesterol, Total 142 <200 MG/DL    Triglycerides 201 (H) 35 - 150 MG/DL    HDL 59 40 - 60 MG/DL    LDL Calculated 42.8 <100 MG/DL    VLDL Cholesterol Calculated 40.2 (H) 6.0 - 23.0 MG/DL    Chol/HDL Ratio 2.4         ASSESSMENT and PLAN    Visit Diagnoses and Associated Orders       Type 2 diabetes mellitus with hyperglycemia, with long-term current use of insulin (HCC)    -  Primary    Dulaglutide (TRULICITY) 1.5 OO/5.4AW SOPN [199219]      Hemoglobin A1C [[id number] Custom]   - Future Order    insulin aspart (NOVOLOG) 100 UNIT/ML injection pen [[id number]]      insulin glargine (LANTUS SOLOSTAR) 100 UNIT/ML injection pen [[id number]]      Hemoglobin A1C [[id number] Custom]   - Future Order         Routine general medical examination at a health care facility        AMB POC URINALYSIS DIP STICK MANUAL W/O MICRO [28058 CPT(R)]   - Future Order    Hepatitis C Antibody [[id number] Custom]   - Future Order    TSH [[id number] Custom]   - Future Order    Vitamin D 25 Hydroxy [[id number] Custom]   - Future Order    Lipid Panel [10133 Custom]   - Future Order    Comprehensive Metabolic Panel

## 2022-11-14 ENCOUNTER — OFFICE VISIT (OUTPATIENT)
Dept: FAMILY MEDICINE CLINIC | Facility: CLINIC | Age: 77
End: 2022-11-14
Payer: MEDICARE

## 2022-11-14 VITALS
WEIGHT: 142 LBS | SYSTOLIC BLOOD PRESSURE: 130 MMHG | DIASTOLIC BLOOD PRESSURE: 80 MMHG | HEIGHT: 60 IN | BODY MASS INDEX: 27.88 KG/M2

## 2022-11-14 DIAGNOSIS — E11.65 TYPE 2 DIABETES MELLITUS WITH HYPERGLYCEMIA, WITH LONG-TERM CURRENT USE OF INSULIN (HCC): ICD-10-CM

## 2022-11-14 DIAGNOSIS — Z79.4 TYPE 2 DIABETES MELLITUS WITH HYPERGLYCEMIA, WITH LONG-TERM CURRENT USE OF INSULIN (HCC): ICD-10-CM

## 2022-11-14 DIAGNOSIS — Z01.83 ENCOUNTER FOR BLOOD TYPING: Primary | ICD-10-CM

## 2022-11-14 LAB — ABO + RH BLD: NORMAL

## 2022-11-14 PROCEDURE — G8399 PT W/DXA RESULTS DOCUMENT: HCPCS | Performed by: FAMILY MEDICINE

## 2022-11-14 PROCEDURE — 99213 OFFICE O/P EST LOW 20 MIN: CPT | Performed by: FAMILY MEDICINE

## 2022-11-14 PROCEDURE — 1123F ACP DISCUSS/DSCN MKR DOCD: CPT | Performed by: FAMILY MEDICINE

## 2022-11-14 PROCEDURE — 1036F TOBACCO NON-USER: CPT | Performed by: FAMILY MEDICINE

## 2022-11-14 PROCEDURE — G8427 DOCREV CUR MEDS BY ELIG CLIN: HCPCS | Performed by: FAMILY MEDICINE

## 2022-11-14 PROCEDURE — 1090F PRES/ABSN URINE INCON ASSESS: CPT | Performed by: FAMILY MEDICINE

## 2022-11-14 PROCEDURE — 3075F SYST BP GE 130 - 139MM HG: CPT | Performed by: FAMILY MEDICINE

## 2022-11-14 PROCEDURE — G8484 FLU IMMUNIZE NO ADMIN: HCPCS | Performed by: FAMILY MEDICINE

## 2022-11-14 PROCEDURE — 3079F DIAST BP 80-89 MM HG: CPT | Performed by: FAMILY MEDICINE

## 2022-11-14 PROCEDURE — G8417 CALC BMI ABV UP PARAM F/U: HCPCS | Performed by: FAMILY MEDICINE

## 2022-11-14 RX ORDER — SIMVASTATIN 20 MG
20 TABLET ORAL NIGHTLY
Qty: 90 TABLET | Refills: 3 | Status: SHIPPED | OUTPATIENT
Start: 2022-11-14

## 2022-11-14 RX ORDER — DULAGLUTIDE 1.5 MG/.5ML
1.5 INJECTION, SOLUTION SUBCUTANEOUS
Qty: 36 ADJUSTABLE DOSE PRE-FILLED PEN SYRINGE | Refills: 5 | Status: SHIPPED | OUTPATIENT
Start: 2022-11-14

## 2022-11-14 RX ORDER — LOSARTAN POTASSIUM AND HYDROCHLOROTHIAZIDE 25; 100 MG/1; MG/1
1 TABLET ORAL DAILY
Qty: 90 TABLET | Refills: 3 | Status: SHIPPED | OUTPATIENT
Start: 2022-11-14

## 2022-11-14 RX ORDER — INSULIN GLARGINE 100 [IU]/ML
50 INJECTION, SOLUTION SUBCUTANEOUS NIGHTLY
Qty: 15 ADJUSTABLE DOSE PRE-FILLED PEN SYRINGE | Refills: 5 | Status: SHIPPED | OUTPATIENT
Start: 2022-11-14

## 2022-11-14 ASSESSMENT — ENCOUNTER SYMPTOMS
NAUSEA: 0
SHORTNESS OF BREATH: 0
VOMITING: 0

## 2022-11-14 ASSESSMENT — PATIENT HEALTH QUESTIONNAIRE - PHQ9
2. FEELING DOWN, DEPRESSED OR HOPELESS: 0
SUM OF ALL RESPONSES TO PHQ9 QUESTIONS 1 & 2: 0
SUM OF ALL RESPONSES TO PHQ QUESTIONS 1-9: 0
SUM OF ALL RESPONSES TO PHQ QUESTIONS 1-9: 0
1. LITTLE INTEREST OR PLEASURE IN DOING THINGS: 0
SUM OF ALL RESPONSES TO PHQ QUESTIONS 1-9: 0
SUM OF ALL RESPONSES TO PHQ QUESTIONS 1-9: 0

## 2022-11-14 NOTE — PROGRESS NOTES
PROGRESS NOTE    SUBJECTIVE:   Eileen Glynn is a 76 y.o. female seen for a follow up visit regarding the following chief complaint:     Chief Complaint   Patient presents with    Discuss Labs     Patient requested blood type test           HPI outside the country and is required to have their blood type in case of emergencies      Past Medical History, Past Surgical History, Family history, Social History, and Medications were all reviewed with the patient today and updated as necessary. Current Outpatient Medications   Medication Sig Dispense Refill    dulaglutide (TRULICITY) 1.5 GK/8.6LI SC injection Inject 0.5 mLs into the skin every 7 days 36 Adjustable Dose Pre-filled Pen Syringe 5    insulin aspart (NOVOLOG) 100 UNIT/ML injection pen Patient will do 8 units in the morning with breakfast 12 units at lunch and 20 units with dinner 15 Adjustable Dose Pre-filled Pen Syringe 5    insulin glargine (LANTUS SOLOSTAR) 100 UNIT/ML injection pen Inject 50 Units into the skin nightly 15 Adjustable Dose Pre-filled Pen Syringe 5    simvastatin (ZOCOR) 20 MG tablet Take 1 tablet by mouth nightly TOME 1 TABLETA CADA NOCHE PARA HIPERCOLESTEROLEMIA 90 tablet 3    losartan-hydroCHLOROthiazide (HYZAAR) 100-25 MG per tablet Take 1 tablet by mouth daily TOME 1 TABLETA DIARIAMENTE 90 tablet 3    metFORMIN (GLUCOPHAGE) 1000 MG tablet Take 1 tablet by mouth 2 times daily (with meals) TAKE ONE TABLET BY MOUTH TWICE A DAY WITH MEALS 180 tablet 3    aspirin 81 MG EC tablet Take 81 mg by mouth daily      Calcium Carb-Cholecalciferol 600-400 MG-UNIT CAPS 1 p.o. daily      ketoconazole (NIZORAL) 2 % shampoo Apply 120 mLs topically       No current facility-administered medications for this visit.      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, implant or graft     premacular fibrosis    History of bilateral cataract extraction     History of glaucoma     Hyperlipidemia     Hypertension      Past Surgical History:   Procedure Laterality Date    APPENDECTOMY      CATARACT REMOVAL       SECTION      COLONOSCOPY N/A 2019    COLONOSCOPY/ BMI 22  performed by Rosy Montenegro MD at [address] [place] [address] FLX W/RMVL OF TUMOR POLYP LESION SNARE TQ  2019         OTHER SURGICAL HISTORY      chronic drop therapy    REFRACTIVE SURGERY       Family History   Problem Relation Age of Onset    Heart Disease Mother     Diabetes Mother     Diabetes Father     Other Brother         bone disease    Heart Attack Brother     Breast Cancer Neg Hx      Social History     Tobacco Use    Smoking status: Never     Passive exposure: Never    Smokeless tobacco: Never   Substance Use Topics    Alcohol use: No     Alcohol/week: 0.0 standard drinks         Review of Systems   Constitutional:  Negative for fatigue and fever. Respiratory:  Negative for shortness of breath. Cardiovascular:  Negative for chest pain. Gastrointestinal:  Negative for nausea and vomiting. OBJECTIVE:  /80 (Site: Left Upper Arm, Position: Sitting, Cuff Size: Small Adult)   Ht 5' 0.25\" (1.53 m)   Wt 142 lb (64.4 kg)   BMI 27.50 kg/m²      Physical Exam  Vitals and nursing note reviewed. Constitutional:       Appearance: Normal appearance. Eyes:      Pupils: Pupils are equal, round, and reactive to light. Cardiovascular:      Rate and Rhythm: Normal rate and regular rhythm. Pulmonary:      Effort: Pulmonary effort is normal.      Breath sounds: Normal breath sounds. Neurological:      Mental Status: She is alert.    Psychiatric:         Mood and Affect: Mood normal.         Behavior: Behavior normal.        Medical problems and test results were reviewed with the patient today. No results found for this or any previous visit (from the past 672 hour(s)). ASSESSMENT and PLAN    Visit Diagnoses and Associated Orders       Encounter for blood typing    -  Primary    ABO/Rh [[id number] Custom]   - Future Order    ABO/Rh [[id number] Custom]           Type 2 diabetes mellitus with hyperglycemia, with long-term current use of insulin (HCC)        dulaglutide (TRULICITY) 1.5 VS/2.9HH SC injection [[id number]]      insulin aspart (NOVOLOG) 100 UNIT/ML injection pen [[id number]]      insulin glargine (LANTUS SOLOSTAR) 100 UNIT/ML injection pen [[id number]]      metFORMIN (GLUCOPHAGE) 1000 MG tablet [[id number]]           ORDERS WITHOUT AN ASSOCIATED DIAGNOSIS    simvastatin (ZOCOR) 20 MG tablet [[id number]]      losartan-hydroCHLOROthiazide (HYZAAR) 100-25 MG per tablet [[id number]]                  Diagnosis Orders   1. Encounter for blood typing  ABO/Rh    ABO/Rh      2. Type 2 diabetes mellitus with hyperglycemia, with long-term current use of insulin (HCC)  dulaglutide (TRULICITY) 1.5 UJ/9.6AE SC injection    insulin aspart (NOVOLOG) 100 UNIT/ML injection pen    insulin glargine (LANTUS SOLOSTAR) 100 UNIT/ML injection pen    metFORMIN (GLUCOPHAGE) 1000 MG tablet      , Mayi Trejo was seen today for discuss labs. Diagnoses and all orders for this visit:    Encounter for blood typing  -     ABO/Rh; Future  -     ABO/Rh    Type 2 diabetes mellitus with hyperglycemia, with long-term current use of insulin (HCC)  -     dulaglutide (TRULICITY) 1.5 TA/9.3MQ SC injection; Inject 0.5 mLs into the skin every 7 days  -     insulin aspart (NOVOLOG) 100 UNIT/ML injection pen; Patient will do 8 units in the morning with breakfast 12 units at lunch and 20 units with dinner  -     insulin glargine (LANTUS SOLOSTAR) 100 UNIT/ML injection pen;  Inject 50 Units into the skin nightly  -     metFORMIN (GLUCOPHAGE) 1000 MG tablet; Take 1 tablet by mouth 2 times daily (with meals) TAKE ONE TABLET BY MOUTH TWICE A DAY WITH MEALS    Other orders  -     simvastatin (ZOCOR) 20 MG tablet; Take 1 tablet by mouth nightly TOME 1 TABLETA CADA NOCHE PARA HIPERCOLESTEROLEMIA  -     losartan-hydroCHLOROthiazide (HYZAAR) 100-25 MG per tablet;  Take 1 tablet by mouth daily TOME 1 TABLETA DIARIAMENTE  , Follow results on [facility] or we can mail/email the results to them before departure to this other country they are going to medications as well

## 2023-03-31 ENCOUNTER — OFFICE VISIT (OUTPATIENT)
Dept: FAMILY MEDICINE CLINIC | Facility: CLINIC | Age: 78
End: 2023-03-31
Payer: MEDICARE

## 2023-03-31 VITALS
SYSTOLIC BLOOD PRESSURE: 136 MMHG | DIASTOLIC BLOOD PRESSURE: 86 MMHG | BODY MASS INDEX: 28.27 KG/M2 | WEIGHT: 144 LBS | HEIGHT: 60 IN

## 2023-03-31 DIAGNOSIS — E78.00 PURE HYPERCHOLESTEROLEMIA: ICD-10-CM

## 2023-03-31 DIAGNOSIS — Z79.4 TYPE 2 DIABETES MELLITUS WITH HYPERGLYCEMIA, WITH LONG-TERM CURRENT USE OF INSULIN (HCC): ICD-10-CM

## 2023-03-31 DIAGNOSIS — E11.65 TYPE 2 DIABETES MELLITUS WITH HYPERGLYCEMIA, WITH LONG-TERM CURRENT USE OF INSULIN (HCC): ICD-10-CM

## 2023-03-31 DIAGNOSIS — B35.0 TINEA CAPITIS: ICD-10-CM

## 2023-03-31 DIAGNOSIS — Z00.00 MEDICARE ANNUAL WELLNESS VISIT, SUBSEQUENT: ICD-10-CM

## 2023-03-31 DIAGNOSIS — I10 ESSENTIAL HYPERTENSION WITH GOAL BLOOD PRESSURE LESS THAN 140/90: ICD-10-CM

## 2023-03-31 DIAGNOSIS — E55.9 VITAMIN D DEFICIENCY, UNSPECIFIED: ICD-10-CM

## 2023-03-31 DIAGNOSIS — Z00.00 ROUTINE GENERAL MEDICAL EXAMINATION AT A HEALTH CARE FACILITY: Primary | ICD-10-CM

## 2023-03-31 DIAGNOSIS — Z13.31 SCREENING FOR DEPRESSION: ICD-10-CM

## 2023-03-31 DIAGNOSIS — R53.83 FATIGUE, UNSPECIFIED TYPE: ICD-10-CM

## 2023-03-31 LAB
BILIRUBIN, URINE, POC: NEGATIVE
BLOOD URINE, POC: NEGATIVE
GLUCOSE URINE, POC: NEGATIVE
KETONES, URINE, POC: NEGATIVE
LEUKOCYTE ESTERASE, URINE, POC: ABNORMAL
MICROALB/CREAT RATIO, POC: <30 MG/G
MICROALBUMIN URINE, POC: 20 MG/L
NITRITE, URINE, POC: NEGATIVE
PH, URINE, POC: 6.5 (ref 4.6–8)
PROTEIN,URINE, POC: NEGATIVE
SPECIFIC GRAVITY, URINE, POC: 1.01 (ref 1–1.03)
URINALYSIS CLARITY, POC: ABNORMAL
URINALYSIS COLOR, POC: YELLOW
UROBILINOGEN, POC: ABNORMAL

## 2023-03-31 PROCEDURE — 3079F DIAST BP 80-89 MM HG: CPT | Performed by: FAMILY MEDICINE

## 2023-03-31 PROCEDURE — 1123F ACP DISCUSS/DSCN MKR DOCD: CPT | Performed by: FAMILY MEDICINE

## 2023-03-31 PROCEDURE — 3075F SYST BP GE 130 - 139MM HG: CPT | Performed by: FAMILY MEDICINE

## 2023-03-31 PROCEDURE — G0439 PPPS, SUBSEQ VISIT: HCPCS | Performed by: FAMILY MEDICINE

## 2023-03-31 PROCEDURE — G8484 FLU IMMUNIZE NO ADMIN: HCPCS | Performed by: FAMILY MEDICINE

## 2023-03-31 PROCEDURE — 82043 UR ALBUMIN QUANTITATIVE: CPT | Performed by: FAMILY MEDICINE

## 2023-03-31 PROCEDURE — 81002 URINALYSIS NONAUTO W/O SCOPE: CPT | Performed by: FAMILY MEDICINE

## 2023-03-31 RX ORDER — DULAGLUTIDE 1.5 MG/.5ML
1.5 INJECTION, SOLUTION SUBCUTANEOUS
Qty: 13 ADJUSTABLE DOSE PRE-FILLED PEN SYRINGE | Refills: 3 | Status: SHIPPED | OUTPATIENT
Start: 2023-03-31

## 2023-03-31 RX ORDER — SIMVASTATIN 20 MG
20 TABLET ORAL NIGHTLY
Qty: 90 TABLET | Refills: 3 | Status: SHIPPED | OUTPATIENT
Start: 2023-03-31

## 2023-03-31 RX ORDER — KETOCONAZOLE 20 MG/ML
120 SHAMPOO TOPICAL DAILY PRN
Qty: 100 ML | Refills: 11 | Status: SHIPPED | OUTPATIENT
Start: 2023-03-31

## 2023-03-31 RX ORDER — LOSARTAN POTASSIUM AND HYDROCHLOROTHIAZIDE 25; 100 MG/1; MG/1
1 TABLET ORAL DAILY
Qty: 90 TABLET | Refills: 3 | Status: SHIPPED | OUTPATIENT
Start: 2023-03-31

## 2023-03-31 RX ORDER — INSULIN GLARGINE 100 [IU]/ML
35 INJECTION, SOLUTION SUBCUTANEOUS NIGHTLY
Qty: 15 ADJUSTABLE DOSE PRE-FILLED PEN SYRINGE | Refills: 3 | Status: SHIPPED | OUTPATIENT
Start: 2023-03-31

## 2023-03-31 SDOH — ECONOMIC STABILITY: FOOD INSECURITY: WITHIN THE PAST 12 MONTHS, THE FOOD YOU BOUGHT JUST DIDN'T LAST AND YOU DIDN'T HAVE MONEY TO GET MORE.: NEVER TRUE

## 2023-03-31 SDOH — ECONOMIC STABILITY: FOOD INSECURITY: WITHIN THE PAST 12 MONTHS, YOU WORRIED THAT YOUR FOOD WOULD RUN OUT BEFORE YOU GOT MONEY TO BUY MORE.: NEVER TRUE

## 2023-03-31 SDOH — ECONOMIC STABILITY: HOUSING INSECURITY
IN THE LAST 12 MONTHS, WAS THERE A TIME WHEN YOU DID NOT HAVE A STEADY PLACE TO SLEEP OR SLEPT IN A SHELTER (INCLUDING NOW)?: NO

## 2023-03-31 SDOH — ECONOMIC STABILITY: INCOME INSECURITY: HOW HARD IS IT FOR YOU TO PAY FOR THE VERY BASICS LIKE FOOD, HOUSING, MEDICAL CARE, AND HEATING?: NOT HARD AT ALL

## 2023-03-31 ASSESSMENT — MINI MENTAL STATE EXAM
WHICH SEASON IS THIS?: 0
WHAT CITY/TOWN ARE WE IN?: 0
SAY: I AM GOING TO NAME THREE OBJECTS. WHEN I AM FINISHED, I WANT YOU TO REPEAT
THEM. REMEMBER WHAT THEY ARE BECAUSE I AM GOING TO ASK YOU TO NAME THEM AGAIN IN
A FEW MINUTES.  SAY THE FOLLOWING WORDS SLOWLY AT 1-SECOND INTERVALS - BALL/ CAR/ MAN [ITERATIONS FOR REPEAT ADMINISTRATION]: 0
SHOW: WRISTWATCH [OBJECT] ASK: WHAT IS THIS CALLED?: 0
WHAT COUNTRY ARE WE IN?: 0
SAY: READ THE WORDS ON THE PAGE AND THEN DO WHAT IT SAYS. THEN HAND THE PERSON
THE SHEET WITH CLOSE YOUR EYES ON IT. IF THE SUBJECT READS AND DOES NOT CLOSE THEIR EYES, REPEAT UP TO THREE TIMES. SCORE ONLY IF SUBJECT CLOSES EYES.: 0
SAY: PUT THE PAPER DOWN ON THE FLOOR, SCORE IF PAPER IS PLACED BACK ON FLOOR: 0
WHAT IS THE NAME OF THIS BUILDING [IN FACILITY]?/WHAT IS THE STREET ADDRESS OF THIS HOUSE [IN HOME]?: 0
SUM ALL MMSE QUESTIONS FOR TOTAL SCORE [OUT OF 30].: 0
HAND THE PERSON A PENCIL AND PAPER. SAY: WRITE ANY COMPLETE SENTENCE ON THAT
PIECE OF PAPER. (NOTE: THE SENTENCE MUST MAKE SENSE. IGNORE SPELLING ERRORS): 0
SAY: FOLD THE PAPER IN HALF ONCE WITH BOTH HANDS, SCORE IF PAPER IS CORRECTLY FOLDED IN HALF.: 0
WHAT IS TODAY'S DATE?: 0
NOW WHAT WERE THE THREE OBJECTS I ASKED YOU TO REMEMBER?: 0
SAY: I WOULD LIKE YOU TO REPEAT THIS PHRASE AFTER ME: NO IFS, ANDS, OR BUTS.: 0
WHAT YEAR IS THIS?: 0
WHAT MONTH IS THIS?: 0
WHAT DAY OF THE WEEK IS THIS?: 0
SHOW: PENCIL [OBJECT] ASK: WHAT IS THIS CALLED?: 0
WHAT FLOOR ARE WE ON [IN FACILITY]?/ WHAT ROOM ARE WE IN [IN HOME]?: 0
PLACE DESIGN, ERASER AND PENCIL IN FRONT OF THE PERSON.  SAY:  COPY THIS DESIGN PLEASE.  SHOW: DESIGN. ALLOW: MULTIPLE TRIES. WAIT UNTIL PERSON IS FINISHED AND HANDS IT BACK. SCORE: ONLY FOR DIAGRAM WITH 4-SIDED FIGURE BETWEEN TWO 5-SIDED FIGURES: 0
WHAT STATE [OR PROVINCE] ARE WE IN?: 0
SAY: I WOULD LIKE YOU TO COUNT BACKWARD FROM 100 BY SEVENS: 0
ASK THE PERSON IF HE IS RIGHT OR LEFT-HANDED. TAKE A PIECE OF PAPER AND HOLD IT UP IN
FRONT OF THE PERSON. SAY: TAKE THIS PAPER IN YOUR RIGHT/LEFT HAND (WHICHEVER IS NON-
DOMINANT), SCORE IF PAPER IS PICKED UP IN CORRECT HAND.: 0

## 2023-03-31 ASSESSMENT — PATIENT HEALTH QUESTIONNAIRE - PHQ9
SUM OF ALL RESPONSES TO PHQ QUESTIONS 1-9: 0
SUM OF ALL RESPONSES TO PHQ QUESTIONS 1-9: 0
2. FEELING DOWN, DEPRESSED OR HOPELESS: 0
SUM OF ALL RESPONSES TO PHQ9 QUESTIONS 1 & 2: 0
SUM OF ALL RESPONSES TO PHQ QUESTIONS 1-9: 0
SUM OF ALL RESPONSES TO PHQ QUESTIONS 1-9: 0
1. LITTLE INTEREST OR PLEASURE IN DOING THINGS: 0

## 2023-04-03 LAB
25(OH)D3 SERPL-MCNC: 38.3 NG/ML (ref 30–100)
ALBUMIN SERPL-MCNC: 4.1 G/DL (ref 3.2–4.6)
ALBUMIN/GLOB SERPL: 1.1 (ref 0.4–1.6)
ALP SERPL-CCNC: 85 U/L (ref 50–136)
ALT SERPL-CCNC: 61 U/L (ref 12–65)
ANION GAP SERPL CALC-SCNC: 5 MMOL/L (ref 2–11)
AST SERPL-CCNC: 35 U/L (ref 15–37)
BASOPHILS # BLD: 0.1 K/UL (ref 0–0.2)
BASOPHILS NFR BLD: 1 % (ref 0–2)
BILIRUB SERPL-MCNC: 0.5 MG/DL (ref 0.2–1.1)
BUN SERPL-MCNC: 17 MG/DL (ref 8–23)
CALCIUM SERPL-MCNC: 9.6 MG/DL (ref 8.3–10.4)
CHLORIDE SERPL-SCNC: 102 MMOL/L (ref 101–110)
CHOLEST SERPL-MCNC: 164 MG/DL
CO2 SERPL-SCNC: 28 MMOL/L (ref 21–32)
CREAT SERPL-MCNC: 0.6 MG/DL (ref 0.6–1)
DIFFERENTIAL METHOD BLD: ABNORMAL
EOSINOPHIL # BLD: 0.2 K/UL (ref 0–0.8)
EOSINOPHIL NFR BLD: 2 % (ref 0.5–7.8)
ERYTHROCYTE [DISTWIDTH] IN BLOOD BY AUTOMATED COUNT: 14.7 % (ref 11.9–14.6)
EST. AVERAGE GLUCOSE BLD GHB EST-MCNC: 209 MG/DL
GLOBULIN SER CALC-MCNC: 3.9 G/DL (ref 2.8–4.5)
GLUCOSE SERPL-MCNC: 99 MG/DL (ref 65–100)
HBA1C MFR BLD: 8.9 % (ref 4.8–5.6)
HCT VFR BLD AUTO: 42.8 % (ref 35.8–46.3)
HDLC SERPL-MCNC: 61 MG/DL (ref 40–60)
HDLC SERPL: 2.7
HGB BLD-MCNC: 13.5 G/DL (ref 11.7–15.4)
IMM GRANULOCYTES # BLD AUTO: 0 K/UL (ref 0–0.5)
IMM GRANULOCYTES NFR BLD AUTO: 0 % (ref 0–5)
LDLC SERPL CALC-MCNC: 45.6 MG/DL
LYMPHOCYTES # BLD: 2.4 K/UL (ref 0.5–4.6)
LYMPHOCYTES NFR BLD: 33 % (ref 13–44)
MCH RBC QN AUTO: 28.8 PG (ref 26.1–32.9)
MCHC RBC AUTO-ENTMCNC: 31.5 G/DL (ref 31.4–35)
MCV RBC AUTO: 91.5 FL (ref 82–102)
MONOCYTES # BLD: 0.5 K/UL (ref 0.1–1.3)
MONOCYTES NFR BLD: 7 % (ref 4–12)
NEUTS SEG # BLD: 4.2 K/UL (ref 1.7–8.2)
NEUTS SEG NFR BLD: 57 % (ref 43–78)
NRBC # BLD: 0 K/UL (ref 0–0.2)
PLATELET # BLD AUTO: 284 K/UL (ref 150–450)
PMV BLD AUTO: 12.4 FL (ref 9.4–12.3)
POTASSIUM SERPL-SCNC: 3.8 MMOL/L (ref 3.5–5.1)
PROT SERPL-MCNC: 8 G/DL (ref 6.3–8.2)
RBC # BLD AUTO: 4.68 M/UL (ref 4.05–5.2)
SODIUM SERPL-SCNC: 135 MMOL/L (ref 133–143)
TRIGL SERPL-MCNC: 287 MG/DL (ref 35–150)
TSH W FREE THYROID IF ABNORMAL: 0.66 UIU/ML (ref 0.36–3.74)
VLDLC SERPL CALC-MCNC: 57.4 MG/DL (ref 6–23)
WBC # BLD AUTO: 7.4 K/UL (ref 4.3–11.1)

## 2023-04-25 ENCOUNTER — TELEMEDICINE (OUTPATIENT)
Dept: FAMILY MEDICINE CLINIC | Facility: CLINIC | Age: 78
End: 2023-04-25
Payer: MEDICARE

## 2023-04-25 DIAGNOSIS — Z79.4 TYPE 2 DIABETES MELLITUS WITH HYPERGLYCEMIA, WITH LONG-TERM CURRENT USE OF INSULIN (HCC): Primary | ICD-10-CM

## 2023-04-25 DIAGNOSIS — E11.65 TYPE 2 DIABETES MELLITUS WITH HYPERGLYCEMIA, WITH LONG-TERM CURRENT USE OF INSULIN (HCC): Primary | ICD-10-CM

## 2023-04-25 DIAGNOSIS — I10 ESSENTIAL HYPERTENSION WITH GOAL BLOOD PRESSURE LESS THAN 140/90: ICD-10-CM

## 2023-04-25 DIAGNOSIS — E78.00 PURE HYPERCHOLESTEROLEMIA: ICD-10-CM

## 2023-04-25 PROCEDURE — 99442 PR PHYS/QHP TELEPHONE EVALUATION 11-20 MIN: CPT | Performed by: FAMILY MEDICINE

## 2023-04-25 ASSESSMENT — PATIENT HEALTH QUESTIONNAIRE - PHQ9
SUM OF ALL RESPONSES TO PHQ QUESTIONS 1-9: 0
2. FEELING DOWN, DEPRESSED OR HOPELESS: 0
SUM OF ALL RESPONSES TO PHQ9 QUESTIONS 1 & 2: 0
1. LITTLE INTEREST OR PLEASURE IN DOING THINGS: 0
SUM OF ALL RESPONSES TO PHQ QUESTIONS 1-9: 0

## 2023-04-25 ASSESSMENT — ENCOUNTER SYMPTOMS
VOMITING: 0
SHORTNESS OF BREATH: 0
NAUSEA: 0

## 2023-04-25 NOTE — PROGRESS NOTES
Case Management Discharge Planning    Admission Date: 10/28/2022  GMLOS: 3.9  ALOS: 5    6-Clicks ADL Score: 13  6-Clicks Mobility Score: 9  PT and/or OT Eval ordered: Yes  Post-acute Referrals Ordered: Yes  Post-acute Choice Obtained: Yes  Has referral(s) been sent to post-acute provider:  Yes      Anticipated Discharge Dispo: Discharge Disposition: Disch to  rehab facility or distinct part unit (62)  Discharge Address: [address] [place] [id number]  Discharge Contact Phone Number: [phone]    DME Needed: No    Action(s) Taken: Updated Provider/Nurse on Discharge Plan    Escalations Completed: None    Medically Clear: Yes    Next Steps: Pt has been accepted to [facility] and transport arranged for [id number] via [facility]. Pt and spouse aware of plan and agree.    Barriers to Discharge: None    Is the patient up for discharge tomorrow: No, tx to rehab today!        PROGRESS NOTE    SUBJECTIVE:   Lazaro Macias is a 76 y.o. female seen for a follow up visit regarding the following chief complaint:       Parmjit Ray is a 76 y.o. female evaluated via telephone on 4/25/2023 for No chief complaint on file. .        Total Time: minutes: 11-20 minutes    Lazaro Macias was evaluated through a synchronous (real-time) audio encounter. Patient identification was verified at the start of the visit. She (or guardian if applicable) is aware that this is a billable service, which includes applicable co-pays. This visit was conducted with the patient's (and/or legal guardian's) verbal consent. She has not had a related appointment within my department in the past 7 days or scheduled within the next 24 hours. The patient was located at Home: [address] [place], [place] [id number]. The provider was located at [place] [address] ([address] [place], [place] [address]t): [address] [place] [address] Dr Boo [address],  [address] [place] [address]. Note: not billable if this call serves to triage the patient into an appointment for the relevant concern         Past Medical History, Past Surgical History, Family history, Social History, and Medications were all reviewed with the patient today and updated as necessary.        Current Outpatient Medications   Medication Sig Dispense Refill    dulaglutide (TRULICITY) 1.5 XW/0.1CA SC injection Inject 0.5 mLs into the skin every 7 days 13 Adjustable Dose Pre-filled Pen Syringe 3    insulin aspart (NOVOLOG) 100 UNIT/ML injection pen Patient will do 8 units in the morning with breakfast 12 units at lunch and 20 units with dinner 15 Adjustable Dose Pre-filled Pen Syringe 5    insulin glargine (LANTUS SOLOSTAR) 100 UNIT/ML injection pen Inject 35 Units into the skin nightly 15 Adjustable Dose Pre-filled Pen Syringe 3    simvastatin (ZOCOR) 20 MG tablet Take 1 tablet by mouth nightly TOME 1 TABLETA CADA NOCHE PARA HIPERCOLESTEROLEMIA 90 tablet 3    losartan-hydroCHLOROthiazide (HYZAAR) 100-25 MG per tablet Take 1 No

## 2023-08-14 ENCOUNTER — OFFICE VISIT (OUTPATIENT)
Dept: FAMILY MEDICINE CLINIC | Facility: CLINIC | Age: 78
End: 2023-08-14
Payer: MEDICARE

## 2023-08-14 ENCOUNTER — NURSE TRIAGE (OUTPATIENT)
Dept: OTHER | Facility: CLINIC | Age: 78
End: 2023-08-14

## 2023-08-14 VITALS
SYSTOLIC BLOOD PRESSURE: 130 MMHG | BODY MASS INDEX: 28.86 KG/M2 | OXYGEN SATURATION: 98 % | DIASTOLIC BLOOD PRESSURE: 80 MMHG | HEART RATE: 100 BPM | HEIGHT: 60 IN | WEIGHT: 147 LBS

## 2023-08-14 DIAGNOSIS — Z79.4 TYPE 2 DIABETES MELLITUS WITH HYPERGLYCEMIA, WITH LONG-TERM CURRENT USE OF INSULIN (HCC): ICD-10-CM

## 2023-08-14 DIAGNOSIS — E11.65 TYPE 2 DIABETES MELLITUS WITH HYPERGLYCEMIA, WITH LONG-TERM CURRENT USE OF INSULIN (HCC): ICD-10-CM

## 2023-08-14 DIAGNOSIS — C44.319 BASAL CELL CARCINOMA (BCC) OF SKIN OF OTHER PART OF FACE: Primary | ICD-10-CM

## 2023-08-14 PROCEDURE — 1090F PRES/ABSN URINE INCON ASSESS: CPT | Performed by: NURSE PRACTITIONER

## 2023-08-14 PROCEDURE — 1123F ACP DISCUSS/DSCN MKR DOCD: CPT | Performed by: NURSE PRACTITIONER

## 2023-08-14 PROCEDURE — G8417 CALC BMI ABV UP PARAM F/U: HCPCS | Performed by: NURSE PRACTITIONER

## 2023-08-14 PROCEDURE — G8399 PT W/DXA RESULTS DOCUMENT: HCPCS | Performed by: NURSE PRACTITIONER

## 2023-08-14 PROCEDURE — 3052F HG A1C>EQUAL 8.0%<EQUAL 9.0%: CPT | Performed by: NURSE PRACTITIONER

## 2023-08-14 PROCEDURE — 1036F TOBACCO NON-USER: CPT | Performed by: NURSE PRACTITIONER

## 2023-08-14 PROCEDURE — 99214 OFFICE O/P EST MOD 30 MIN: CPT | Performed by: NURSE PRACTITIONER

## 2023-08-14 PROCEDURE — 3079F DIAST BP 80-89 MM HG: CPT | Performed by: NURSE PRACTITIONER

## 2023-08-14 PROCEDURE — G8427 DOCREV CUR MEDS BY ELIG CLIN: HCPCS | Performed by: NURSE PRACTITIONER

## 2023-08-14 PROCEDURE — 3075F SYST BP GE 130 - 139MM HG: CPT | Performed by: NURSE PRACTITIONER

## 2023-08-14 ASSESSMENT — ENCOUNTER SYMPTOMS
WHEEZING: 0
APNEA: 0
EYE ITCHING: 0
EYE PAIN: 0
TROUBLE SWALLOWING: 0
GASTROINTESTINAL NEGATIVE: 1
EYES NEGATIVE: 1
EYE REDNESS: 0
CONSTIPATION: 0
EYE DISCHARGE: 0
STRIDOR: 0
COUGH: 0
VOMITING: 0
RHINORRHEA: 0
BACK PAIN: 0
CHOKING: 0
SHORTNESS OF BREATH: 0
ABDOMINAL PAIN: 0
FACIAL SWELLING: 0
NAUSEA: 0
COLOR CHANGE: 0
ANAL BLEEDING: 0
ALLERGIC/IMMUNOLOGIC NEGATIVE: 1
RECTAL PAIN: 0
CHEST TIGHTNESS: 0
SINUS PAIN: 0
SORE THROAT: 0
RESPIRATORY NEGATIVE: 1
DIARRHEA: 0
SINUS PRESSURE: 0
ABDOMINAL DISTENTION: 0
BLOOD IN STOOL: 0
VOICE CHANGE: 0
PHOTOPHOBIA: 0

## 2023-08-14 NOTE — PROGRESS NOTES
Heart sounds: Normal heart sounds. Pulmonary:      Effort: Pulmonary effort is normal.      Breath sounds: Normal breath sounds. Abdominal:      General: Abdomen is flat. Bowel sounds are normal.      Palpations: Abdomen is soft. Skin:     General: Skin is warm and dry. Findings: Lesion present. Neurological:      General: No focal deficit present. Mental Status: She is alert and oriented to person, place, and time. Psychiatric:         Mood and Affect: Mood normal.         Behavior: Behavior normal.         Thought Content: Thought content normal.         Judgment: Judgment normal.         Medical problems and test results were reviewed with the patient today. ASSESSMENT and PLAN    1. Basal cell carcinoma (BCC) of skin of other part of face  -     External Referral to Dermatology    Patient presented to office with a an abnormal skin lesion that was not present until 1 month ago. Since then, it has rapidly increased in size despite topical OTC treatment of Cortizone-10 and Neosporin. Skin lesion appears consistent with basal cell carcinoma. Urgent referral placed to advanced dermatology. Office called and an appointment scheduled for August 31 at 10 AM with Dr. Luna Lane located on [address] [place] [address]. Appointment information along with contact information for office provided to patient, her spouse and patient's daughter Lauren Veras. Copy of referral also given to patient. 2.  Diabetes   Per PCP recommendation. Elements of this note have been dictated using speech recognition software. As a result, errors of speech recognition may have occurred. On this date 8/14/2023 I have spent 31 minutes reviewing previous notes, test results and face to face with the patient discussing the diagnosis and importance of compliance with the treatment plan as well as documenting on the day of the visit.  Greater than 50% of this visit was spent counseling the patient about test results,

## 2023-08-14 NOTE — TELEPHONE ENCOUNTER
Location of patient: [address] and  are on the phone    Received call from Mary at [facility] with [employer]. Subjective: Caller states \"Growth on face- right side of cheek\"     Current Symptoms: pink, small amt of swelling-  states the size of a pea. - pt is diabetic- denies pain,     Onset: 2 months ago; gradual    Associated Symptoms: n/a    Pain Severity: 0/10; N/A; none    Temperature: no fevers     What has been tried: Cortizone cream    LMP: NA Pregnant: NA    Recommended disposition: See PCP within 3 Days    Care advice provided, patient verbalizes understanding; denies any other questions or concerns; instructed to call back for any new or worsening symptoms. Patient/Caller agrees with recommended disposition; writer provided warm transfer to TEAMS at [facility] for appointment scheduling    Attention Provider: Thank you for allowing me to participate in the care of your patient. The patient was connected to triage in response to information provided to the ECC/PSC. Please do not respond through this encounter as the response is not directed to a shared pool.         Reason for Disposition   Small swelling or lump present > 1 week    Protocols used: Skin Lump or Localized Swelling-ADULT-[place]

## 2023-08-31 ENCOUNTER — APPOINTMENT (RX ONLY)
Dept: URBAN - METROPOLITAN AREA CLINIC 330 | Facility: CLINIC | Age: 78
Setting detail: DERMATOLOGY
End: 2023-08-31

## 2023-08-31 PROBLEM — D48.5 NEOPLASM OF UNCERTAIN BEHAVIOR OF SKIN: Status: ACTIVE | Noted: 2023-08-31

## 2023-08-31 PROCEDURE — ? BIOPSY BY SHAVE METHOD

## 2023-08-31 PROCEDURE — 11102 TANGNTL BX SKIN SINGLE LES: CPT

## 2023-08-31 NOTE — PROCEDURE: BIOPSY BY SHAVE METHOD
Detail Level: Detailed
Depth Of Biopsy: dermis
Was A Bandage Applied: Yes
Size Of Lesion In Cm: 0
Biopsy Type: H and E
Biopsy Method: Personna blade
Anesthesia Type: 1% lidocaine with epinephrine and a 1:10 solution of 8.4% sodium bicarbonate
Anesthesia Volume In Cc (Will Not Render If 0): 0.5
Hemostasis: Aluminum Chloride
Wound Care: Petrolatum
Dressing: bandage
Destruction After The Procedure: No
Type Of Destruction Used: Curettage
Curettage Text: The wound bed was treated with curettage after the biopsy was performed.
Cryotherapy Text: The wound bed was treated with cryotherapy after the biopsy was performed.
Electrodesiccation Text: The wound bed was treated with electrodesiccation after the biopsy was performed.
Electrodesiccation And Curettage Text: The wound bed was treated with electrodesiccation and curettage after the biopsy was performed.
Silver Nitrate Text: The wound bed was treated with silver nitrate after the biopsy was performed.
Lab: 6
Lab Facility: 3
Consent: Written consent was obtained and risk was reviewed  per signed  consent form. Biopsy was performed with patient verbal permission.
Post-care instructions were given and  reviewed with the patient in detail. Patient is to keep the biopsy site dry overnight, and then apply healing ointment daily until healed. Patient is instructed to call for any questions or problems.
Notification Instructions: Patient will be notified of biopsy results. However, patient instructed to call the office if not contacted within 2 weeks.
Billing Type: Third-Party Bill
Information: Selecting Yes will display possible errors in your note based on the variables you have selected. This validation is only offered as a suggestion for you. PLEASE NOTE THAT THE VALIDATION TEXT WILL BE REMOVED WHEN YOU FINALIZE YOUR NOTE. IF YOU WANT TO FAX A PRELIMINARY NOTE YOU WILL NEED TO TOGGLE THIS TO 'NO' IF YOU DO NOT WANT IT IN YOUR FAXED NOTE.

## 2023-12-06 ENCOUNTER — APPOINTMENT (RX ONLY)
Dept: URBAN - NONMETROPOLITAN AREA CLINIC 1 | Facility: CLINIC | Age: 78
Setting detail: DERMATOLOGY
End: 2023-12-06

## 2023-12-06 DIAGNOSIS — D18.0 HEMANGIOMA: ICD-10-CM

## 2023-12-06 DIAGNOSIS — D22 MELANOCYTIC NEVI: ICD-10-CM | Status: STABLE

## 2023-12-06 DIAGNOSIS — L21.8 OTHER SEBORRHEIC DERMATITIS: ICD-10-CM

## 2023-12-06 DIAGNOSIS — L82.1 OTHER SEBORRHEIC KERATOSIS: ICD-10-CM

## 2023-12-06 DIAGNOSIS — D485 NEOPLASM OF UNCERTAIN BEHAVIOR OF SKIN: ICD-10-CM

## 2023-12-06 PROBLEM — D48.5 NEOPLASM OF UNCERTAIN BEHAVIOR OF SKIN: Status: ACTIVE | Noted: 2023-12-06

## 2023-12-06 PROBLEM — D22.9 MELANOCYTIC NEVI, UNSPECIFIED: Status: ACTIVE | Noted: 2023-12-06

## 2023-12-06 PROBLEM — D18.01 HEMANGIOMA OF SKIN AND SUBCUTANEOUS TISSUE: Status: ACTIVE | Noted: 2023-12-06

## 2023-12-06 PROCEDURE — 11102 TANGNTL BX SKIN SINGLE LES: CPT

## 2023-12-06 PROCEDURE — ? BIOPSY BY SHAVE METHOD

## 2023-12-06 PROCEDURE — ? COUNSELING

## 2023-12-06 PROCEDURE — ? OTHER

## 2023-12-06 PROCEDURE — ? FULL BODY SKIN EXAM - DECLINED

## 2023-12-06 PROCEDURE — ? PRESCRIPTION

## 2023-12-06 PROCEDURE — 99213 OFFICE O/P EST LOW 20 MIN: CPT | Mod: 25

## 2023-12-06 RX ORDER — KETOCONAZOLE 20 MG/ML
SHAMPOO, SUSPENSION TOPICAL
Qty: 120 | Refills: 5 | Status: ERX | COMMUNITY
Start: 2023-12-06

## 2023-12-06 RX ADMIN — KETOCONAZOLE: 20 SHAMPOO, SUSPENSION TOPICAL at 00:00

## 2023-12-06 ASSESSMENT — LOCATION DETAILED DESCRIPTION DERM
LOCATION DETAILED: RIGHT CENTRAL MALAR CHEEK
LOCATION DETAILED: RIGHT MEDIAL BREAST 2-3:00 REGION
LOCATION DETAILED: RIGHT MEDIAL SUPERIOR CHEST

## 2023-12-06 ASSESSMENT — LOCATION SIMPLE DESCRIPTION DERM
LOCATION SIMPLE: RIGHT CHEEK
LOCATION SIMPLE: CHEST
LOCATION SIMPLE: RIGHT BREAST

## 2023-12-06 ASSESSMENT — LOCATION ZONE DERM
LOCATION ZONE: FACE
LOCATION ZONE: TRUNK

## 2023-12-06 NOTE — PROCEDURE: MIPS QUALITY
Quality 110: Preventive Care And Screening: Influenza Immunization: Influenza Immunization Administered during Influenza season
Quality 130: Documentation Of Current Medications In The Medical Record: Current Medications Documented
Quality 47: Advance Care Plan: Advance care planning not documented, reason not otherwise specified.
Quality 226: Preventive Care And Screening: Tobacco Use: Screening And Cessation Intervention: Patient screened for tobacco use and is an ex/non-smoker
Detail Level: Detailed
Quality 402: Tobacco Use And Help With Quitting Among Adolescents: Patient screened for tobacco and never smoked
Quality 431: Preventive Care And Screening: Unhealthy Alcohol Use - Screening: Patient not identified as an unhealthy alcohol user when screened for unhealthy alcohol use using a systematic screening method

## 2023-12-06 NOTE — PROCEDURE: OTHER
Detail Level: Zone
Other (Free Text): Patient consent was obtained to proceed with the visit and recommended plan of care after discussion of all risks and benefits, including the risks of COVID-19 exposure.
Note Text (......Xxx Chief Complaint.): This diagnosis correlates with the
Render Risk Assessment In Note?: no

## 2023-12-06 NOTE — PROCEDURE: BIOPSY BY SHAVE METHOD
Detail Level: Detailed
Depth Of Biopsy: dermis
Was A Bandage Applied: Yes
Size Of Lesion In Cm: 1.5
X Size Of Lesion In Cm: 0
Accession #: Skin Pathology
Biopsy Type: H and E
Biopsy Method: Dermablade
Anesthesia Type: 1% lidocaine with epinephrine and a 1:10 solution of 8.4% sodium bicarbonate
Anesthesia Volume In Cc: 0.5
Hemostasis: Aluminum Chloride
Wound Care: Petrolatum
Dressing: bandage
Destruction After The Procedure: No
Type Of Destruction Used: Curettage
Curettage Text: The wound bed was treated with curettage after the biopsy was performed.
Cryotherapy Text: The wound bed was treated with cryotherapy after the biopsy was performed.
Electrodesiccation Text: The wound bed was treated with electrodesiccation after the biopsy was performed.
Electrodesiccation And Curettage Text: The wound bed was treated with electrodesiccation and curettage after the biopsy was performed.
Silver Nitrate Text: The wound bed was treated with silver nitrate after the biopsy was performed.
Lab: -97
Consent: Written consent was obtained and risk was reviewed  per signed  consent form. Biopsy was performed with patient verbal permission.
Post-care instructions were given and  reviewed with the patient in detail. Patient is to keep the biopsy site dry overnight, and then apply healing ointment daily until healed. Patient is instructed to call for any questions or problems.
Notification Instructions: Patient will be notified of biopsy results. However, patient instructed to call the office if not contacted within 2 weeks.
Billing Type: Third-Party Bill
Information: Selecting Yes will display possible errors in your note based on the variables you have selected. This validation is only offered as a suggestion for you. PLEASE NOTE THAT THE VALIDATION TEXT WILL BE REMOVED WHEN YOU FINALIZE YOUR NOTE. IF YOU WANT TO FAX A PRELIMINARY NOTE YOU WILL NEED TO TOGGLE THIS TO 'NO' IF YOU DO NOT WANT IT IN YOUR FAXED NOTE.

## 2024-04-01 DIAGNOSIS — E78.00 PURE HYPERCHOLESTEROLEMIA: ICD-10-CM

## 2024-04-01 DIAGNOSIS — Z79.4 TYPE 2 DIABETES MELLITUS WITH HYPERGLYCEMIA, WITH LONG-TERM CURRENT USE OF INSULIN (HCC): ICD-10-CM

## 2024-04-01 DIAGNOSIS — I10 ESSENTIAL HYPERTENSION WITH GOAL BLOOD PRESSURE LESS THAN 140/90: ICD-10-CM

## 2024-04-01 DIAGNOSIS — E11.65 TYPE 2 DIABETES MELLITUS WITH HYPERGLYCEMIA, WITH LONG-TERM CURRENT USE OF INSULIN (HCC): ICD-10-CM

## 2024-04-01 RX ORDER — SIMVASTATIN 20 MG
20 TABLET ORAL NIGHTLY
Qty: 90 TABLET | Refills: 3 | Status: SHIPPED | OUTPATIENT
Start: 2024-04-01 | End: 2024-04-05 | Stop reason: SDUPTHER

## 2024-04-01 RX ORDER — DULAGLUTIDE 1.5 MG/.5ML
1.5 INJECTION, SOLUTION SUBCUTANEOUS
Qty: 13 ADJUSTABLE DOSE PRE-FILLED PEN SYRINGE | Refills: 3 | Status: SHIPPED | OUTPATIENT
Start: 2024-04-01 | End: 2024-04-05 | Stop reason: SDUPTHER

## 2024-04-01 RX ORDER — LOSARTAN POTASSIUM AND HYDROCHLOROTHIAZIDE 25; 100 MG/1; MG/1
1 TABLET ORAL DAILY
Qty: 90 TABLET | Refills: 0 | Status: SHIPPED | OUTPATIENT
Start: 2024-04-01

## 2024-04-01 RX ORDER — LOSARTAN POTASSIUM AND HYDROCHLOROTHIAZIDE 25; 100 MG/1; MG/1
1 TABLET ORAL DAILY
Qty: 90 TABLET | Refills: 3 | Status: CANCELLED | OUTPATIENT
Start: 2024-04-01

## 2024-04-01 RX ORDER — INSULIN GLARGINE 100 [IU]/ML
35 INJECTION, SOLUTION SUBCUTANEOUS NIGHTLY
Qty: 15 ADJUSTABLE DOSE PRE-FILLED PEN SYRINGE | Refills: 3 | Status: SHIPPED | OUTPATIENT
Start: 2024-04-01 | End: 2024-04-05 | Stop reason: SDUPTHER

## 2024-04-05 ENCOUNTER — OFFICE VISIT (OUTPATIENT)
Dept: FAMILY MEDICINE CLINIC | Facility: CLINIC | Age: 79
End: 2024-04-05
Payer: MEDICARE

## 2024-04-05 VITALS
DIASTOLIC BLOOD PRESSURE: 70 MMHG | HEIGHT: 60 IN | SYSTOLIC BLOOD PRESSURE: 128 MMHG | OXYGEN SATURATION: 94 % | HEART RATE: 72 BPM | BODY MASS INDEX: 28.66 KG/M2 | WEIGHT: 146 LBS

## 2024-04-05 DIAGNOSIS — Z79.4 TYPE 2 DIABETES MELLITUS WITH HYPERGLYCEMIA, WITH LONG-TERM CURRENT USE OF INSULIN (HCC): ICD-10-CM

## 2024-04-05 DIAGNOSIS — E11.65 TYPE 2 DIABETES MELLITUS WITH HYPERGLYCEMIA, WITH LONG-TERM CURRENT USE OF INSULIN (HCC): ICD-10-CM

## 2024-04-05 DIAGNOSIS — E55.9 VITAMIN D DEFICIENCY, UNSPECIFIED: ICD-10-CM

## 2024-04-05 DIAGNOSIS — Z00.00 MEDICARE ANNUAL WELLNESS VISIT, SUBSEQUENT: ICD-10-CM

## 2024-04-05 DIAGNOSIS — Z00.00 ROUTINE GENERAL MEDICAL EXAMINATION AT A HEALTH CARE FACILITY: Primary | ICD-10-CM

## 2024-04-05 DIAGNOSIS — R53.83 FATIGUE, UNSPECIFIED TYPE: ICD-10-CM

## 2024-04-05 DIAGNOSIS — E78.00 PURE HYPERCHOLESTEROLEMIA: ICD-10-CM

## 2024-04-05 DIAGNOSIS — B35.0 TINEA CAPITIS: ICD-10-CM

## 2024-04-05 DIAGNOSIS — I10 ESSENTIAL HYPERTENSION WITH GOAL BLOOD PRESSURE LESS THAN 140/90: ICD-10-CM

## 2024-04-05 LAB
25(OH)D3 SERPL-MCNC: 36.5 NG/ML (ref 30–100)
ALBUMIN SERPL-MCNC: 4.2 G/DL (ref 3.2–4.6)
ALBUMIN/GLOB SERPL: 1.1 (ref 0.4–1.6)
ALP SERPL-CCNC: 103 U/L (ref 50–136)
ALT SERPL-CCNC: 78 U/L (ref 12–65)
ANION GAP SERPL CALC-SCNC: 7 MMOL/L (ref 2–11)
AST SERPL-CCNC: 36 U/L (ref 15–37)
BASOPHILS # BLD: 0.1 K/UL (ref 0–0.2)
BASOPHILS NFR BLD: 1 % (ref 0–2)
BILIRUB SERPL-MCNC: 0.4 MG/DL (ref 0.2–1.1)
BILIRUBIN, URINE, POC: NEGATIVE
BLOOD URINE, POC: NEGATIVE
BUN SERPL-MCNC: 17 MG/DL (ref 8–23)
CALCIUM SERPL-MCNC: 9.6 MG/DL (ref 8.3–10.4)
CHLORIDE SERPL-SCNC: 100 MMOL/L (ref 103–113)
CHOLEST SERPL-MCNC: 188 MG/DL
CO2 SERPL-SCNC: 29 MMOL/L (ref 21–32)
CREAT SERPL-MCNC: 0.5 MG/DL (ref 0.6–1)
DIFFERENTIAL METHOD BLD: ABNORMAL
EOSINOPHIL # BLD: 0.2 K/UL (ref 0–0.8)
EOSINOPHIL NFR BLD: 3 % (ref 0.5–7.8)
ERYTHROCYTE [DISTWIDTH] IN BLOOD BY AUTOMATED COUNT: 13.7 % (ref 11.9–14.6)
GLOBULIN SER CALC-MCNC: 3.8 G/DL (ref 2.8–4.5)
GLUCOSE SERPL-MCNC: 169 MG/DL (ref 65–100)
GLUCOSE URINE, POC: ABNORMAL
HCT VFR BLD AUTO: 43.8 % (ref 35.8–46.3)
HDLC SERPL-MCNC: 58 MG/DL (ref 40–60)
HDLC SERPL: 3.2
HGB BLD-MCNC: 13.7 G/DL (ref 11.7–15.4)
IMM GRANULOCYTES # BLD AUTO: 0 K/UL (ref 0–0.5)
IMM GRANULOCYTES NFR BLD AUTO: 0 % (ref 0–5)
KETONES, URINE, POC: NEGATIVE
LDLC SERPL CALC-MCNC: 73.2 MG/DL
LEUKOCYTE ESTERASE, URINE, POC: ABNORMAL
LYMPHOCYTES # BLD: 2.2 K/UL (ref 0.5–4.6)
LYMPHOCYTES NFR BLD: 36 % (ref 13–44)
MCH RBC QN AUTO: 28.8 PG (ref 26.1–32.9)
MCHC RBC AUTO-ENTMCNC: 31.3 G/DL (ref 31.4–35)
MCV RBC AUTO: 92 FL (ref 82–102)
MICROALB/CREAT RATIO, POC: ABNORMAL
MICROALBUMIN URINE, POC: 50 MG/L
MONOCYTES # BLD: 0.4 K/UL (ref 0.1–1.3)
MONOCYTES NFR BLD: 7 % (ref 4–12)
NEUTS SEG # BLD: 3.4 K/UL (ref 1.7–8.2)
NEUTS SEG NFR BLD: 53 % (ref 43–78)
NITRITE, URINE, POC: NEGATIVE
NRBC # BLD: 0 K/UL (ref 0–0.2)
PH, URINE, POC: 5.5 (ref 4.6–8)
PLATELET # BLD AUTO: 281 K/UL (ref 150–450)
PMV BLD AUTO: 12.5 FL (ref 9.4–12.3)
POTASSIUM SERPL-SCNC: 3.7 MMOL/L (ref 3.5–5.1)
PROT SERPL-MCNC: 8 G/DL (ref 6.3–8.2)
PROTEIN,URINE, POC: NEGATIVE
RBC # BLD AUTO: 4.76 M/UL (ref 4.05–5.2)
SODIUM SERPL-SCNC: 136 MMOL/L (ref 136–146)
SPECIFIC GRAVITY, URINE, POC: 1.01 (ref 1–1.03)
TRIGL SERPL-MCNC: 284 MG/DL (ref 35–150)
TSH W FREE THYROID IF ABNORMAL: 1.57 UIU/ML (ref 0.36–3.74)
URINALYSIS CLARITY, POC: ABNORMAL
URINALYSIS COLOR, POC: YELLOW
UROBILINOGEN, POC: NORMAL
VLDLC SERPL CALC-MCNC: 56.8 MG/DL (ref 6–23)
WBC # BLD AUTO: 6.3 K/UL (ref 4.3–11.1)

## 2024-04-05 PROCEDURE — 36415 COLL VENOUS BLD VENIPUNCTURE: CPT | Performed by: FAMILY MEDICINE

## 2024-04-05 PROCEDURE — 3078F DIAST BP <80 MM HG: CPT | Performed by: FAMILY MEDICINE

## 2024-04-05 PROCEDURE — 81003 URINALYSIS AUTO W/O SCOPE: CPT | Performed by: FAMILY MEDICINE

## 2024-04-05 PROCEDURE — 1123F ACP DISCUSS/DSCN MKR DOCD: CPT | Performed by: FAMILY MEDICINE

## 2024-04-05 PROCEDURE — 3074F SYST BP LT 130 MM HG: CPT | Performed by: FAMILY MEDICINE

## 2024-04-05 PROCEDURE — G0439 PPPS, SUBSEQ VISIT: HCPCS | Performed by: FAMILY MEDICINE

## 2024-04-05 PROCEDURE — 82043 UR ALBUMIN QUANTITATIVE: CPT | Performed by: FAMILY MEDICINE

## 2024-04-05 RX ORDER — KETOCONAZOLE 20 MG/ML
120 SHAMPOO TOPICAL DAILY PRN
Qty: 100 ML | Refills: 11 | Status: SHIPPED | OUTPATIENT
Start: 2024-04-05

## 2024-04-05 RX ORDER — INSULIN GLARGINE 100 [IU]/ML
35 INJECTION, SOLUTION SUBCUTANEOUS NIGHTLY
Qty: 15 ADJUSTABLE DOSE PRE-FILLED PEN SYRINGE | Refills: 3 | Status: SHIPPED | OUTPATIENT
Start: 2024-04-05

## 2024-04-05 RX ORDER — SIMVASTATIN 20 MG
20 TABLET ORAL NIGHTLY
Qty: 90 TABLET | Refills: 3 | Status: SHIPPED | OUTPATIENT
Start: 2024-04-05

## 2024-04-05 RX ORDER — DULAGLUTIDE 1.5 MG/.5ML
1.5 INJECTION, SOLUTION SUBCUTANEOUS
Qty: 13 ADJUSTABLE DOSE PRE-FILLED PEN SYRINGE | Refills: 3 | Status: SHIPPED | OUTPATIENT
Start: 2024-04-05

## 2024-04-05 SDOH — ECONOMIC STABILITY: FOOD INSECURITY: WITHIN THE PAST 12 MONTHS, YOU WORRIED THAT YOUR FOOD WOULD RUN OUT BEFORE YOU GOT MONEY TO BUY MORE.: NEVER TRUE

## 2024-04-05 SDOH — ECONOMIC STABILITY: INCOME INSECURITY: HOW HARD IS IT FOR YOU TO PAY FOR THE VERY BASICS LIKE FOOD, HOUSING, MEDICAL CARE, AND HEATING?: NOT HARD AT ALL

## 2024-04-05 SDOH — ECONOMIC STABILITY: FOOD INSECURITY: WITHIN THE PAST 12 MONTHS, THE FOOD YOU BOUGHT JUST DIDN'T LAST AND YOU DIDN'T HAVE MONEY TO GET MORE.: NEVER TRUE

## 2024-04-05 ASSESSMENT — PATIENT HEALTH QUESTIONNAIRE - PHQ9
SUM OF ALL RESPONSES TO PHQ QUESTIONS 1-9: 0
SUM OF ALL RESPONSES TO PHQ QUESTIONS 1-9: 0
2. FEELING DOWN, DEPRESSED OR HOPELESS: NOT AT ALL
1. LITTLE INTEREST OR PLEASURE IN DOING THINGS: NOT AT ALL
SUM OF ALL RESPONSES TO PHQ QUESTIONS 1-9: 0
SUM OF ALL RESPONSES TO PHQ9 QUESTIONS 1 & 2: 0
SUM OF ALL RESPONSES TO PHQ QUESTIONS 1-9: 0

## 2024-04-05 NOTE — PROGRESS NOTES
Medicare Annual Wellness Visit    Radha Kelley is here for Medicare AWV and Annual Exam    Assessment & Plan   Essential hypertension with goal blood pressure less than 140/90  -     Comprehensive Metabolic Panel; Future  -     CBC with Auto Differential; Future  -     AMB POC URINALYSIS DIP STICK AUTO W/O MICRO; Future  Type 2 diabetes mellitus with hyperglycemia, with long-term current use of insulin (HCC)  -     dulaglutide (TRULICITY) 1.5 MG/0.5ML SC injection; Inject 0.5 mLs into the skin every 7 days, Disp-13 Adjustable Dose Pre-filled Pen Syringe, R-3Normal  -     insulin aspart (NOVOLOG) 100 UNIT/ML injection pen; Patient will do 8 units in the morning with breakfast 12 units at lunch and 20 units with dinner, Disp-15 Adjustable Dose Pre-filled Pen Syringe, R-5Normal  -     insulin glargine (LANTUS SOLOSTAR) 100 UNIT/ML injection pen; Inject 35 Units into the skin nightly, Disp-15 Adjustable Dose Pre-filled Pen Syringe, R-3Normal  -     metFORMIN (GLUCOPHAGE) 1000 MG tablet; Take 1 tablet by mouth 2 times daily (with meals) TAKE ONE TABLET BY MOUTH TWICE A DAY WITH MEALS, Disp-180 tablet, R-3Normal  -     Hemoglobin A1C; Future  -     AMB POC URINE, MICROALBUMIN; Future  Tinea capitis  -     ketoconazole (NIZORAL) 2 % shampoo; Apply 120 mLs topically daily as needed for Itching, Topical, DAILY PRN Starting Fri 4/5/2024, Disp-100 mL, R-11, Normal  Pure hypercholesterolemia  -     simvastatin (ZOCOR) 20 MG tablet; Take 1 tablet by mouth nightly TOME 1 TABLETA CADA NOCHE PARA HIPERCOLESTEROLEMIA, Disp-90 tablet, R-3Normal  -     Lipid Panel; Future  Routine general medical examination at a health care facility  Vitamin D deficiency, unspecified  -     Vitamin D 25 Hydroxy; Future  Fatigue, unspecified type  -     TSH with Reflex; Future  Medicare annual wellness visit, subsequent    Recommendations for Preventive Services Due: see orders and patient instructions/AVS.  Recommended screening schedule for the next

## 2024-04-06 LAB
EST. AVERAGE GLUCOSE BLD GHB EST-MCNC: 240 MG/DL
HBA1C MFR BLD: 10 % (ref 4.8–5.6)

## 2024-04-10 DIAGNOSIS — Z79.4 TYPE 2 DIABETES MELLITUS WITH HYPERGLYCEMIA, WITH LONG-TERM CURRENT USE OF INSULIN (HCC): ICD-10-CM

## 2024-04-10 DIAGNOSIS — E11.65 TYPE 2 DIABETES MELLITUS WITH HYPERGLYCEMIA, WITH LONG-TERM CURRENT USE OF INSULIN (HCC): ICD-10-CM

## 2024-04-10 RX ORDER — DULAGLUTIDE 1.5 MG/.5ML
INJECTION, SOLUTION SUBCUTANEOUS
Refills: 3 | OUTPATIENT
Start: 2024-04-10

## 2024-04-30 ENCOUNTER — OFFICE VISIT (OUTPATIENT)
Dept: FAMILY MEDICINE CLINIC | Facility: CLINIC | Age: 79
End: 2024-04-30
Payer: MEDICARE

## 2024-04-30 VITALS
BODY MASS INDEX: 28.86 KG/M2 | WEIGHT: 147 LBS | HEIGHT: 60 IN | DIASTOLIC BLOOD PRESSURE: 76 MMHG | HEART RATE: 92 BPM | SYSTOLIC BLOOD PRESSURE: 140 MMHG

## 2024-04-30 DIAGNOSIS — E78.00 PURE HYPERCHOLESTEROLEMIA: ICD-10-CM

## 2024-04-30 DIAGNOSIS — Z79.4 TYPE 2 DIABETES MELLITUS WITH HYPERGLYCEMIA, WITH LONG-TERM CURRENT USE OF INSULIN (HCC): Primary | ICD-10-CM

## 2024-04-30 DIAGNOSIS — I10 ESSENTIAL HYPERTENSION WITH GOAL BLOOD PRESSURE LESS THAN 140/90: ICD-10-CM

## 2024-04-30 DIAGNOSIS — E11.65 TYPE 2 DIABETES MELLITUS WITH HYPERGLYCEMIA, WITH LONG-TERM CURRENT USE OF INSULIN (HCC): Primary | ICD-10-CM

## 2024-04-30 PROCEDURE — 1090F PRES/ABSN URINE INCON ASSESS: CPT | Performed by: FAMILY MEDICINE

## 2024-04-30 PROCEDURE — G8399 PT W/DXA RESULTS DOCUMENT: HCPCS | Performed by: FAMILY MEDICINE

## 2024-04-30 PROCEDURE — G8417 CALC BMI ABV UP PARAM F/U: HCPCS | Performed by: FAMILY MEDICINE

## 2024-04-30 PROCEDURE — 1123F ACP DISCUSS/DSCN MKR DOCD: CPT | Performed by: FAMILY MEDICINE

## 2024-04-30 PROCEDURE — G8428 CUR MEDS NOT DOCUMENT: HCPCS | Performed by: FAMILY MEDICINE

## 2024-04-30 PROCEDURE — 3078F DIAST BP <80 MM HG: CPT | Performed by: FAMILY MEDICINE

## 2024-04-30 PROCEDURE — 3077F SYST BP >= 140 MM HG: CPT | Performed by: FAMILY MEDICINE

## 2024-04-30 PROCEDURE — 1036F TOBACCO NON-USER: CPT | Performed by: FAMILY MEDICINE

## 2024-04-30 PROCEDURE — 3046F HEMOGLOBIN A1C LEVEL >9.0%: CPT | Performed by: FAMILY MEDICINE

## 2024-04-30 PROCEDURE — 99213 OFFICE O/P EST LOW 20 MIN: CPT | Performed by: FAMILY MEDICINE

## 2024-04-30 RX ORDER — LOSARTAN POTASSIUM AND HYDROCHLOROTHIAZIDE 25; 100 MG/1; MG/1
1 TABLET ORAL DAILY
Qty: 90 TABLET | Refills: 3 | Status: SHIPPED | OUTPATIENT
Start: 2024-04-30

## 2024-04-30 RX ORDER — DULAGLUTIDE 1.5 MG/.5ML
1.5 INJECTION, SOLUTION SUBCUTANEOUS
Qty: 3 ADJUSTABLE DOSE PRE-FILLED PEN SYRINGE | Refills: 3 | Status: CANCELLED | OUTPATIENT
Start: 2024-04-30

## 2024-04-30 ASSESSMENT — ENCOUNTER SYMPTOMS
NAUSEA: 0
SHORTNESS OF BREATH: 0
VOMITING: 0

## 2024-04-30 NOTE — PROGRESS NOTES
Pure hypercholesterolemia        , Radha was seen today for follow-up.    Diagnoses and all orders for this visit:    Type 2 diabetes mellitus with hyperglycemia, with long-term current use of insulin (HCC)  -     Hemoglobin A1C; Future    Essential hypertension with goal blood pressure less than 140/90  -     losartan-hydroCHLOROthiazide (HYZAAR) 100-25 MG per tablet; Take 1 tablet by mouth daily    Pure hypercholesterolemia    , Reviewed her labs and her A1c is trending up she is being controlled with diet exercise and as well as insulin we will have her check her blood sugars before breakfast lunch and dinner and in about a week we will call her back with her results as well as how to increase or decrease her insulin pending those results we will call her back supportive care recheck A1c in 4 to 6 months

## 2024-05-14 ENCOUNTER — TELEPHONE (OUTPATIENT)
Dept: FAMILY MEDICINE CLINIC | Facility: CLINIC | Age: 79
End: 2024-05-14

## 2024-05-14 NOTE — TELEPHONE ENCOUNTER
Patient stated her glucometer is not working need a new glucometer . I called [facility] pharmacy verbal order giving for truemetrix  meter with lancets 33 G  one box and control solution level 2  and test strips

## 2024-06-13 ENCOUNTER — TELEPHONE (OUTPATIENT)
Dept: FAMILY MEDICINE CLINIC | Facility: CLINIC | Age: 79
End: 2024-06-13

## 2024-06-13 NOTE — TELEPHONE ENCOUNTER
I called Mrs Kelley this morning around 11:20 am to reminder her about her office appointment at 3 PM today and to bring a copy of her login of sugar level before meals as  Dr Meza advice her . Patient stated she has no done every day  and request an appointment to reschedule to 07/3 around .

## 2024-06-14 NOTE — TELEPHONE ENCOUNTER
Mrs Kelley's daughters called requesting to schedule an appointment with Dr Meza regard Mrs. Kelley diabetes  I did schedule an office appointment on 07/3 on that day she will come with her mother and father . Rosalia Kelley has questions,  and concerns regard her mother's diabetes she would like address with Dr Meza.  
Never

## 2024-07-03 ENCOUNTER — OFFICE VISIT (OUTPATIENT)
Dept: FAMILY MEDICINE CLINIC | Facility: CLINIC | Age: 79
End: 2024-07-03
Payer: MEDICARE

## 2024-07-03 VITALS
DIASTOLIC BLOOD PRESSURE: 76 MMHG | HEART RATE: 94 BPM | BODY MASS INDEX: 29.06 KG/M2 | WEIGHT: 148 LBS | SYSTOLIC BLOOD PRESSURE: 136 MMHG | HEIGHT: 60 IN

## 2024-07-03 DIAGNOSIS — E11.65 TYPE 2 DIABETES MELLITUS WITH HYPERGLYCEMIA, WITH LONG-TERM CURRENT USE OF INSULIN (HCC): ICD-10-CM

## 2024-07-03 DIAGNOSIS — Z79.4 TYPE 2 DIABETES MELLITUS WITH HYPERGLYCEMIA, WITH LONG-TERM CURRENT USE OF INSULIN (HCC): ICD-10-CM

## 2024-07-03 PROCEDURE — 3075F SYST BP GE 130 - 139MM HG: CPT | Performed by: FAMILY MEDICINE

## 2024-07-03 PROCEDURE — G8417 CALC BMI ABV UP PARAM F/U: HCPCS | Performed by: FAMILY MEDICINE

## 2024-07-03 PROCEDURE — 1123F ACP DISCUSS/DSCN MKR DOCD: CPT | Performed by: FAMILY MEDICINE

## 2024-07-03 PROCEDURE — 99213 OFFICE O/P EST LOW 20 MIN: CPT | Performed by: FAMILY MEDICINE

## 2024-07-03 PROCEDURE — 1036F TOBACCO NON-USER: CPT | Performed by: FAMILY MEDICINE

## 2024-07-03 PROCEDURE — G8427 DOCREV CUR MEDS BY ELIG CLIN: HCPCS | Performed by: FAMILY MEDICINE

## 2024-07-03 PROCEDURE — 3046F HEMOGLOBIN A1C LEVEL >9.0%: CPT | Performed by: FAMILY MEDICINE

## 2024-07-03 PROCEDURE — 3078F DIAST BP <80 MM HG: CPT | Performed by: FAMILY MEDICINE

## 2024-07-03 PROCEDURE — G8399 PT W/DXA RESULTS DOCUMENT: HCPCS | Performed by: FAMILY MEDICINE

## 2024-07-03 PROCEDURE — 1090F PRES/ABSN URINE INCON ASSESS: CPT | Performed by: FAMILY MEDICINE

## 2024-07-03 ASSESSMENT — ENCOUNTER SYMPTOMS
NAUSEA: 0
SHORTNESS OF BREATH: 0
VOMITING: 0

## 2024-07-03 NOTE — PROGRESS NOTES
PROGRESS NOTE    SUBJECTIVE:   Radha Kelley is a 79 y.o. female seen for a follow up visit regarding the following chief complaint:     Chief Complaint   Patient presents with    Follow-up     Glucose level follow up           HPI patient presents office for follow-up of her diabetes brought in her diary log and her blood sugars are from the 150s to 200s in the morning patient is being escorted by her daughter      Past Medical History, Past Surgical History, Family history, Social History, and Medications were all reviewed with the patient today and updated as necessary.       Current Outpatient Medications   Medication Sig Dispense Refill    insulin aspart (NOVOLOG) 100 UNIT/ML injection pen Patient will do 8 units in the morning with breakfast 12 units at lunch and 20 units with dinner 15 Adjustable Dose Pre-filled Pen Syringe 5    losartan-hydroCHLOROthiazide (HYZAAR) 100-25 MG per tablet Take 1 tablet by mouth daily 90 tablet 3    dulaglutide (TRULICITY) 1.5 MG/0.5ML SC injection Inject 0.5 mLs into the skin every 7 days 13 Adjustable Dose Pre-filled Pen Syringe 3    insulin glargine (LANTUS SOLOSTAR) 100 UNIT/ML injection pen Inject 35 Units into the skin nightly 15 Adjustable Dose Pre-filled Pen Syringe 3    ketoconazole (NIZORAL) 2 % shampoo Apply 120 mLs topically daily as needed for Itching 100 mL 11    metFORMIN (GLUCOPHAGE) 1000 MG tablet Take 1 tablet by mouth 2 times daily (with meals) TAKE ONE TABLET BY MOUTH TWICE A DAY WITH MEALS 180 tablet 3    simvastatin (ZOCOR) 20 MG tablet Take 1 tablet by mouth nightly TOME 1 TABLETA CADA NOCHE PARA HIPERCOLESTEROLEMIA 90 tablet 3    aspirin 81 MG EC tablet Take 1 tablet by mouth daily      Calcium Carb-Cholecalciferol 600-400 MG-UNIT CAPS 1 p.o. daily       No current facility-administered medications for this visit.     No Known Allergies  Patient Active Problem List   Diagnosis    Pure hypercholesterolemia    Essential hypertension with goal blood pressure

## 2024-09-10 ENCOUNTER — LAB (OUTPATIENT)
Dept: FAMILY MEDICINE CLINIC | Facility: CLINIC | Age: 79
End: 2024-09-10

## 2024-09-10 DIAGNOSIS — Z79.4 TYPE 2 DIABETES MELLITUS WITH HYPERGLYCEMIA, WITH LONG-TERM CURRENT USE OF INSULIN (HCC): Primary | ICD-10-CM

## 2024-09-10 DIAGNOSIS — E78.00 PURE HYPERCHOLESTEROLEMIA: ICD-10-CM

## 2024-09-10 DIAGNOSIS — I10 ESSENTIAL HYPERTENSION WITH GOAL BLOOD PRESSURE LESS THAN 140/90: ICD-10-CM

## 2024-09-10 DIAGNOSIS — E11.65 TYPE 2 DIABETES MELLITUS WITH HYPERGLYCEMIA, WITH LONG-TERM CURRENT USE OF INSULIN (HCC): Primary | ICD-10-CM

## 2024-09-10 LAB
ALBUMIN SERPL-MCNC: 3.8 G/DL (ref 3.2–4.6)
ALBUMIN/GLOB SERPL: 1.1 (ref 1–1.9)
ALP SERPL-CCNC: 106 U/L (ref 35–104)
ALT SERPL-CCNC: 85 U/L (ref 12–65)
ANION GAP SERPL CALC-SCNC: 11 MMOL/L (ref 9–18)
AST SERPL-CCNC: 43 U/L (ref 15–37)
BILIRUB SERPL-MCNC: 0.3 MG/DL (ref 0–1.2)
BUN SERPL-MCNC: 16 MG/DL (ref 8–23)
CALCIUM SERPL-MCNC: 9.6 MG/DL (ref 8.8–10.2)
CHLORIDE SERPL-SCNC: 101 MMOL/L (ref 98–107)
CHOLEST SERPL-MCNC: 203 MG/DL (ref 0–200)
CO2 SERPL-SCNC: 28 MMOL/L (ref 20–28)
CREAT SERPL-MCNC: 0.63 MG/DL (ref 0.6–1.1)
EST. AVERAGE GLUCOSE BLD GHB EST-MCNC: 266 MG/DL
GLOBULIN SER CALC-MCNC: 3.5 G/DL (ref 2.3–3.5)
GLUCOSE SERPL-MCNC: 216 MG/DL (ref 70–99)
HBA1C MFR BLD: 10.9 % (ref 0–5.6)
HDLC SERPL-MCNC: 49 MG/DL (ref 40–60)
HDLC SERPL: 4.2 (ref 0–5)
LDLC SERPL CALC-MCNC: 96 MG/DL (ref 0–100)
POTASSIUM SERPL-SCNC: 3.8 MMOL/L (ref 3.5–5.1)
PROT SERPL-MCNC: 7.3 G/DL (ref 6.3–8.2)
SODIUM SERPL-SCNC: 140 MMOL/L (ref 136–145)
TRIGL SERPL-MCNC: 292 MG/DL (ref 0–150)
VLDLC SERPL CALC-MCNC: 58 MG/DL (ref 6–23)

## 2024-09-23 ENCOUNTER — OFFICE VISIT (OUTPATIENT)
Dept: FAMILY MEDICINE CLINIC | Facility: CLINIC | Age: 79
End: 2024-09-23
Payer: MEDICARE

## 2024-09-23 VITALS
OXYGEN SATURATION: 94 % | BODY MASS INDEX: 28.66 KG/M2 | SYSTOLIC BLOOD PRESSURE: 130 MMHG | HEIGHT: 60 IN | DIASTOLIC BLOOD PRESSURE: 80 MMHG | WEIGHT: 146 LBS | HEART RATE: 91 BPM

## 2024-09-23 DIAGNOSIS — I10 ESSENTIAL HYPERTENSION WITH GOAL BLOOD PRESSURE LESS THAN 140/90: ICD-10-CM

## 2024-09-23 DIAGNOSIS — Z79.4 TYPE 2 DIABETES MELLITUS WITH HYPERGLYCEMIA, WITH LONG-TERM CURRENT USE OF INSULIN (HCC): Primary | ICD-10-CM

## 2024-09-23 DIAGNOSIS — E11.65 TYPE 2 DIABETES MELLITUS WITH HYPERGLYCEMIA, WITH LONG-TERM CURRENT USE OF INSULIN (HCC): Primary | ICD-10-CM

## 2024-09-23 DIAGNOSIS — E78.00 PURE HYPERCHOLESTEROLEMIA: ICD-10-CM

## 2024-09-23 PROCEDURE — 1123F ACP DISCUSS/DSCN MKR DOCD: CPT | Performed by: FAMILY MEDICINE

## 2024-09-23 PROCEDURE — G8417 CALC BMI ABV UP PARAM F/U: HCPCS | Performed by: FAMILY MEDICINE

## 2024-09-23 PROCEDURE — G8399 PT W/DXA RESULTS DOCUMENT: HCPCS | Performed by: FAMILY MEDICINE

## 2024-09-23 PROCEDURE — 99215 OFFICE O/P EST HI 40 MIN: CPT | Performed by: FAMILY MEDICINE

## 2024-09-23 PROCEDURE — 3079F DIAST BP 80-89 MM HG: CPT | Performed by: FAMILY MEDICINE

## 2024-09-23 PROCEDURE — 3046F HEMOGLOBIN A1C LEVEL >9.0%: CPT | Performed by: FAMILY MEDICINE

## 2024-09-23 PROCEDURE — 3075F SYST BP GE 130 - 139MM HG: CPT | Performed by: FAMILY MEDICINE

## 2024-09-23 PROCEDURE — 1090F PRES/ABSN URINE INCON ASSESS: CPT | Performed by: FAMILY MEDICINE

## 2024-09-23 PROCEDURE — G8427 DOCREV CUR MEDS BY ELIG CLIN: HCPCS | Performed by: FAMILY MEDICINE

## 2024-09-23 PROCEDURE — 1036F TOBACCO NON-USER: CPT | Performed by: FAMILY MEDICINE

## 2024-09-23 ASSESSMENT — ENCOUNTER SYMPTOMS
NAUSEA: 0
VOMITING: 0
SHORTNESS OF BREATH: 0

## 2024-10-01 ENCOUNTER — TELEMEDICINE (OUTPATIENT)
Dept: FAMILY MEDICINE CLINIC | Facility: CLINIC | Age: 79
End: 2024-10-01
Payer: MEDICARE

## 2024-10-01 DIAGNOSIS — Z79.4 TYPE 2 DIABETES MELLITUS WITH HYPERGLYCEMIA, WITH LONG-TERM CURRENT USE OF INSULIN (HCC): Primary | ICD-10-CM

## 2024-10-01 DIAGNOSIS — E11.65 TYPE 2 DIABETES MELLITUS WITH HYPERGLYCEMIA, WITH LONG-TERM CURRENT USE OF INSULIN (HCC): Primary | ICD-10-CM

## 2024-10-01 PROCEDURE — 99443 PR PHYS/QHP TELEPHONE EVALUATION 21-30 MIN: CPT | Performed by: FAMILY MEDICINE

## 2024-10-01 ASSESSMENT — ENCOUNTER SYMPTOMS
NAUSEA: 0
SHORTNESS OF BREATH: 0
VOMITING: 0

## 2024-10-01 NOTE — PROGRESS NOTES
PROGRESS NOTE    SUBJECTIVE:   Radha Kelley is a 79 y.o. female seen for a follow up visit regarding the following chief complaint:     No chief complaint on file.          HPI patient is doing a phone call visit to go over her recent blood sugars to adjust her insulin/Lantus/NovoLog sliding scaleRadha Kelley is a 79 y.o. female evaluated via telephone on 10/1/2024 for No chief complaint on file.  .        Total Time: minutes: 21-30 minutes    Radha Kelley was evaluated through a synchronous (real-time) audio encounter. Patient identification was verified at the start of the visit. She (or guardian if applicable) is aware that this is a billable service, which includes applicable co-pays. This visit was conducted with the patient's (and/or legal guardian's) verbal consent. She has not had a related appointment within my department in the past 7 days or scheduled within the next 24 hours.   The patient was located at Home: [address] [place] [id number].  The provider was located at Facility (Appt Dept): [address] [place] [address] [place] [id number].    Note: not billable if this call serves to triage the patient into an appointment for the relevant concern         Past Medical History, Past Surgical History, Family history, Social History, and Medications were all reviewed with the patient today and updated as necessary.       Current Outpatient Medications   Medication Sig Dispense Refill    insulin aspart (NOVOLOG) 100 UNIT/ML injection pen Patient will do 8 units in the morning with breakfast 12 units at lunch and 20 units with dinner 15 Adjustable Dose Pre-filled Pen Syringe 5    losartan-hydroCHLOROthiazide (HYZAAR) 100-25 MG per tablet Take 1 tablet by mouth daily 90 tablet 3    dulaglutide (TRULICITY) 1.5 MG/0.5ML SC injection Inject 0.5 mLs into the skin every 7 days 13 Adjustable Dose Pre-filled Pen Syringe 3    insulin glargine (LANTUS SOLOSTAR) 100 UNIT/ML injection pen Inject 35 Units into

## 2024-10-04 DIAGNOSIS — E11.65 TYPE 2 DIABETES MELLITUS WITH HYPERGLYCEMIA, WITH LONG-TERM CURRENT USE OF INSULIN (HCC): ICD-10-CM

## 2024-10-04 DIAGNOSIS — Z79.4 TYPE 2 DIABETES MELLITUS WITH HYPERGLYCEMIA, WITH LONG-TERM CURRENT USE OF INSULIN (HCC): ICD-10-CM

## 2024-10-07 ENCOUNTER — TELEMEDICINE (OUTPATIENT)
Dept: FAMILY MEDICINE CLINIC | Facility: CLINIC | Age: 79
End: 2024-10-07
Payer: MEDICARE

## 2024-10-07 DIAGNOSIS — Z79.4 TYPE 2 DIABETES MELLITUS WITH HYPERGLYCEMIA, WITH LONG-TERM CURRENT USE OF INSULIN (HCC): Primary | ICD-10-CM

## 2024-10-07 DIAGNOSIS — E11.65 TYPE 2 DIABETES MELLITUS WITH HYPERGLYCEMIA, WITH LONG-TERM CURRENT USE OF INSULIN (HCC): Primary | ICD-10-CM

## 2024-10-07 PROCEDURE — 99443 PR PHYS/QHP TELEPHONE EVALUATION 21-30 MIN: CPT | Performed by: FAMILY MEDICINE

## 2024-10-07 RX ORDER — INSULIN GLARGINE 100 [IU]/ML
50 INJECTION, SOLUTION SUBCUTANEOUS NIGHTLY
Qty: 15 ADJUSTABLE DOSE PRE-FILLED PEN SYRINGE | Refills: 3 | Status: SHIPPED | OUTPATIENT
Start: 2024-10-07

## 2024-10-07 RX ORDER — BLOOD SUGAR DIAGNOSTIC
1 STRIP MISCELLANEOUS DAILY
Qty: 300 EACH | Refills: 3 | Status: SHIPPED | OUTPATIENT
Start: 2024-10-07

## 2024-10-07 NOTE — PROGRESS NOTES
units in the morning with breakfast 12 units at lunch and 20 units with dinner 15 Adjustable Dose Pre-filled Pen Syringe 5    losartan-hydroCHLOROthiazide (HYZAAR) 100-25 MG per tablet Take 1 tablet by mouth daily 90 tablet 3    dulaglutide (TRULICITY) 1.5 MG/0.5ML SC injection Inject 0.5 mLs into the skin every 7 days 13 Adjustable Dose Pre-filled Pen Syringe 3    ketoconazole (NIZORAL) 2 % shampoo Apply 120 mLs topically daily as needed for Itching 100 mL 11    metFORMIN (GLUCOPHAGE) 1000 MG tablet Take 1 tablet by mouth 2 times daily (with meals) TAKE ONE TABLET BY MOUTH TWICE A DAY WITH MEALS 180 tablet 3    simvastatin (ZOCOR) 20 MG tablet Take 1 tablet by mouth nightly TOME 1 TABLETA CADA NOCHE PARA HIPERCOLESTEROLEMIA 90 tablet 3    aspirin 81 MG EC tablet Take 1 tablet by mouth daily      Calcium Carb-Cholecalciferol 600-400 MG-UNIT CAPS 1 p.o. daily       No current facility-administered medications for this visit.     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

## 2024-10-21 ENCOUNTER — TELEMEDICINE (OUTPATIENT)
Dept: FAMILY MEDICINE CLINIC | Facility: CLINIC | Age: 79
End: 2024-10-21
Payer: MEDICARE

## 2024-10-21 DIAGNOSIS — E11.65 TYPE 2 DIABETES MELLITUS WITH HYPERGLYCEMIA, WITH LONG-TERM CURRENT USE OF INSULIN (HCC): Primary | ICD-10-CM

## 2024-10-21 DIAGNOSIS — Z79.4 TYPE 2 DIABETES MELLITUS WITH HYPERGLYCEMIA, WITH LONG-TERM CURRENT USE OF INSULIN (HCC): Primary | ICD-10-CM

## 2024-10-21 PROCEDURE — 99442 PR PHYS/QHP TELEPHONE EVALUATION 11-20 MIN: CPT | Performed by: FAMILY MEDICINE

## 2024-10-21 RX ORDER — BLOOD SUGAR DIAGNOSTIC
1 STRIP MISCELLANEOUS DAILY
Qty: 300 EACH | Refills: 3 | Status: SHIPPED | OUTPATIENT
Start: 2024-10-21

## 2024-10-21 ASSESSMENT — PATIENT HEALTH QUESTIONNAIRE - PHQ9
SUM OF ALL RESPONSES TO PHQ9 QUESTIONS 1 & 2: 0
SUM OF ALL RESPONSES TO PHQ QUESTIONS 1-9: 0
1. LITTLE INTEREST OR PLEASURE IN DOING THINGS: NOT AT ALL
2. FEELING DOWN, DEPRESSED OR HOPELESS: NOT AT ALL

## 2024-10-21 ASSESSMENT — ENCOUNTER SYMPTOMS
VOMITING: 0
NAUSEA: 0
SHORTNESS OF BREATH: 0

## 2024-10-21 NOTE — PROGRESS NOTES
PROGRESS NOTE    SUBJECTIVE:   Radha Kelley is a 79 y.o. female seen for a follow up visit regarding the following chief complaint:     Chief Complaint   Patient presents with   • Diabetes     Review Blood sugars           HPI patient doing a phone call visit to go over lab results without any new complaintsRadha Kelley is a 79 y.o. female evaluated via telephone on 10/21/2024 for Diabetes (Review Blood sugars)  .        Total Time: minutes: 11-20 minutes    Radha Kelley was evaluated through a synchronous (real-time) audio encounter. Patient identification was verified at the start of the visit. She (or guardian if applicable) is aware that this is a billable service, which includes applicable co-pays. This visit was conducted with the patient's (and/or legal guardian's) verbal consent. She has not had a related appointment within my department in the past 7 days or scheduled within the next 24 hours.   The patient was located at Home: [address] [place] [phone].  The provider was located at Facility (Appt Dept): [address] [place] [address] Dr Crawford [address] [place] [id number].    Note: not billable if this call serves to triage the patient into an appointment for the relevant concern         Past Medical History, Past Surgical History, Family history, Social History, and Medications were all reviewed with the patient today and updated as necessary.       Current Outpatient Medications   Medication Sig Dispense Refill   • blood glucose test strips (ACCU-CHEK DANIEL PLUS) strip 1 each by In Vitro route daily As needed. 300 each 3   • insulin glargine (LANTUS SOLOSTAR) 100 UNIT/ML injection pen Inject 50 Units into the skin nightly 15 Adjustable Dose Pre-filled Pen Syringe 3   • insulin aspart (NOVOLOG) 100 UNIT/ML injection pen Patient will do 8 units in the morning with breakfast 12 units at lunch and 20 units with dinner 15 Adjustable Dose Pre-filled Pen Syringe 5   • losartan-hydroCHLOROthiazide (HYZAAR) 100-25 MG

## 2024-12-23 ENCOUNTER — LAB (OUTPATIENT)
Dept: FAMILY MEDICINE CLINIC | Facility: CLINIC | Age: 79
End: 2024-12-23

## 2024-12-23 DIAGNOSIS — Z79.4 TYPE 2 DIABETES MELLITUS WITH HYPERGLYCEMIA, WITH LONG-TERM CURRENT USE OF INSULIN (HCC): ICD-10-CM

## 2024-12-23 DIAGNOSIS — E11.65 TYPE 2 DIABETES MELLITUS WITH HYPERGLYCEMIA, WITH LONG-TERM CURRENT USE OF INSULIN (HCC): ICD-10-CM

## 2024-12-23 LAB
EST. AVERAGE GLUCOSE BLD GHB EST-MCNC: 235 MG/DL
HBA1C MFR BLD: 9.8 % (ref 0–5.6)

## 2025-01-03 ENCOUNTER — OFFICE VISIT (OUTPATIENT)
Dept: FAMILY MEDICINE CLINIC | Facility: CLINIC | Age: 80
End: 2025-01-03

## 2025-01-03 VITALS
BODY MASS INDEX: 28.86 KG/M2 | WEIGHT: 147 LBS | DIASTOLIC BLOOD PRESSURE: 76 MMHG | SYSTOLIC BLOOD PRESSURE: 120 MMHG | HEART RATE: 81 BPM | HEIGHT: 60 IN

## 2025-01-03 DIAGNOSIS — I10 ESSENTIAL HYPERTENSION WITH GOAL BLOOD PRESSURE LESS THAN 140/90: ICD-10-CM

## 2025-01-03 DIAGNOSIS — Z79.4 TYPE 2 DIABETES MELLITUS WITH HYPERGLYCEMIA, WITH LONG-TERM CURRENT USE OF INSULIN (HCC): ICD-10-CM

## 2025-01-03 DIAGNOSIS — E78.00 PURE HYPERCHOLESTEROLEMIA: ICD-10-CM

## 2025-01-03 DIAGNOSIS — E11.65 TYPE 2 DIABETES MELLITUS WITH HYPERGLYCEMIA, WITH LONG-TERM CURRENT USE OF INSULIN (HCC): ICD-10-CM

## 2025-01-03 RX ORDER — INSULIN GLARGINE 100 [IU]/ML
60 INJECTION, SOLUTION SUBCUTANEOUS NIGHTLY
COMMUNITY
End: 2025-01-03 | Stop reason: SINTOL

## 2025-01-03 RX ORDER — SIMVASTATIN 20 MG
20 TABLET ORAL NIGHTLY
Qty: 90 TABLET | Refills: 3 | Status: SHIPPED | OUTPATIENT
Start: 2025-01-03

## 2025-01-03 RX ORDER — INSULIN GLARGINE 100 [IU]/ML
60 INJECTION, SOLUTION SUBCUTANEOUS NIGHTLY
Status: CANCELLED | OUTPATIENT
Start: 2025-01-03

## 2025-01-03 RX ORDER — BLOOD SUGAR DIAGNOSTIC
1 STRIP MISCELLANEOUS DAILY
Qty: 300 EACH | Refills: 3 | Status: SHIPPED | OUTPATIENT
Start: 2025-01-03

## 2025-01-03 RX ORDER — INSULIN GLARGINE 100 [IU]/ML
60 INJECTION, SOLUTION SUBCUTANEOUS NIGHTLY
Qty: 100 ML | Refills: 11 | Status: SHIPPED | OUTPATIENT
Start: 2025-01-03

## 2025-01-03 RX ORDER — LOSARTAN POTASSIUM AND HYDROCHLOROTHIAZIDE 25; 100 MG/1; MG/1
1 TABLET ORAL DAILY
Qty: 90 TABLET | Refills: 3 | Status: SHIPPED | OUTPATIENT
Start: 2025-01-03

## 2025-01-03 ASSESSMENT — ENCOUNTER SYMPTOMS
VOMITING: 0
NAUSEA: 0
SHORTNESS OF BREATH: 0

## 2025-01-03 NOTE — PROGRESS NOTES
PROGRESS NOTE    SUBJECTIVE:   Radha Kelley is a 79 y.o. female seen for a follow up visit regarding the following chief complaint:     Chief Complaint   Patient presents with    Follow-up     labs           HPI patient presents to the office today to go over lab result she presents to the office with her  and also wants to do a phone call visit with her daughter Rosalia who is a nurse      Past Medical History, Past Surgical History, Family history, Social History, and Medications were all reviewed with the patient today and updated as necessary.       Current Outpatient Medications   Medication Sig Dispense Refill    simvastatin (ZOCOR) 20 MG tablet Take 1 tablet by mouth nightly TOME 1 TABLETA CADA NOCHE PARA HIPERCOLESTEROLEMIA 90 tablet 3    metFORMIN (GLUCOPHAGE) 1000 MG tablet Take 1 tablet by mouth 2 times daily (with meals) TAKE ONE TABLET BY MOUTH TWICE A DAY WITH MEALS 180 tablet 3    losartan-hydroCHLOROthiazide (HYZAAR) 100-25 MG per tablet Take 1 tablet by mouth daily 90 tablet 3    insulin aspart (NOVOLOG) 100 UNIT/ML injection pen Patient will do 8 units in the morning with breakfast 12 units at lunch and 20 units with dinner 15 Adjustable Dose Pre-filled Pen Syringe 5    blood glucose test strips (ACCU-CHEK DANIEL PLUS) strip 1 each by In Vitro route daily As needed. 300 each 3    dulaglutide (TRULICITY) 1.5 MG/0.5ML SC injection Inject 0.5 mLs into the skin once a week 15 Adjustable Dose Pre-filled Pen Syringe 3    insulin glargine (LANTUS SOLOSTAR) 100 UNIT/ML injection pen Inject 60 Units into the skin nightly 100 mL 11    aspirin 81 MG EC tablet Take 1 tablet by mouth daily      Calcium Carb-Cholecalciferol 600-400 MG-UNIT CAPS 1 p.o. daily       No current facility-administered medications for this visit.     No Known Allergies  Patient Active Problem List   Diagnosis    Pure hypercholesterolemia    Essential hypertension with goal blood pressure less than 140/90    Type 2 diabetes mellitus

## 2025-04-28 ENCOUNTER — OFFICE VISIT (OUTPATIENT)
Dept: FAMILY MEDICINE CLINIC | Facility: CLINIC | Age: 80
End: 2025-04-28
Payer: MEDICARE

## 2025-04-28 VITALS
WEIGHT: 146 LBS | OXYGEN SATURATION: 96 % | SYSTOLIC BLOOD PRESSURE: 126 MMHG | DIASTOLIC BLOOD PRESSURE: 70 MMHG | BODY MASS INDEX: 28.66 KG/M2 | HEART RATE: 85 BPM | HEIGHT: 60 IN

## 2025-04-28 DIAGNOSIS — Z00.00 MEDICARE ANNUAL WELLNESS VISIT, SUBSEQUENT: ICD-10-CM

## 2025-04-28 DIAGNOSIS — E11.65 TYPE 2 DIABETES MELLITUS WITH HYPERGLYCEMIA, WITH LONG-TERM CURRENT USE OF INSULIN (HCC): ICD-10-CM

## 2025-04-28 DIAGNOSIS — E55.9 VITAMIN D DEFICIENCY: ICD-10-CM

## 2025-04-28 DIAGNOSIS — R79.89 LOW VITAMIN D LEVEL: ICD-10-CM

## 2025-04-28 DIAGNOSIS — I10 ESSENTIAL HYPERTENSION WITH GOAL BLOOD PRESSURE LESS THAN 140/90: ICD-10-CM

## 2025-04-28 DIAGNOSIS — Z79.4 TYPE 2 DIABETES MELLITUS WITH HYPERGLYCEMIA, WITH LONG-TERM CURRENT USE OF INSULIN (HCC): ICD-10-CM

## 2025-04-28 DIAGNOSIS — Z00.00 ROUTINE GENERAL MEDICAL EXAMINATION AT A HEALTH CARE FACILITY: Primary | ICD-10-CM

## 2025-04-28 DIAGNOSIS — E78.00 PURE HYPERCHOLESTEROLEMIA: ICD-10-CM

## 2025-04-28 DIAGNOSIS — Z13.31 SCREENING FOR DEPRESSION: ICD-10-CM

## 2025-04-28 DIAGNOSIS — Z78.0 POSTMENOPAUSAL: ICD-10-CM

## 2025-04-28 DIAGNOSIS — E55.9 VITAMIN D DEFICIENCY, UNSPECIFIED: ICD-10-CM

## 2025-04-28 DIAGNOSIS — R30.0 DYSURIA: ICD-10-CM

## 2025-04-28 LAB
25(OH)D3 SERPL-MCNC: 35.4 NG/ML (ref 30–100)
ALBUMIN SERPL-MCNC: 4 G/DL (ref 3.2–4.6)
ALBUMIN/GLOB SERPL: 1.1 (ref 1–1.9)
ALP SERPL-CCNC: 91 U/L (ref 35–104)
ALT SERPL-CCNC: 41 U/L (ref 8–45)
ANION GAP SERPL CALC-SCNC: 15 MMOL/L (ref 7–16)
AST SERPL-CCNC: 28 U/L (ref 15–37)
BILIRUB SERPL-MCNC: 0.3 MG/DL (ref 0–1.2)
BILIRUBIN, URINE, POC: NEGATIVE
BLOOD URINE, POC: NEGATIVE
BUN SERPL-MCNC: 16 MG/DL (ref 8–23)
CALCIUM SERPL-MCNC: 10 MG/DL (ref 8.8–10.2)
CHLORIDE SERPL-SCNC: 100 MMOL/L (ref 98–107)
CHOLEST SERPL-MCNC: 163 MG/DL (ref 0–200)
CO2 SERPL-SCNC: 28 MMOL/L (ref 20–29)
CREAT SERPL-MCNC: 0.66 MG/DL (ref 0.6–1.1)
CREAT UR-MCNC: 90.3 MG/DL (ref 28–217)
EST. AVERAGE GLUCOSE BLD GHB EST-MCNC: 236 MG/DL
GLOBULIN SER CALC-MCNC: 3.5 G/DL (ref 2.3–3.5)
GLUCOSE SERPL-MCNC: 150 MG/DL (ref 70–99)
GLUCOSE URINE, POC: ABNORMAL
GRANS ABSOLUTE, POC: 4.3 K/UL
GRANULOCYTES %, POC: 52.6 %
HBA1C MFR BLD: 9.9 % (ref 0–5.6)
HDLC SERPL-MCNC: 53 MG/DL (ref 40–60)
HDLC SERPL: 3.1 (ref 0–5)
HEMATOCRIT, POC: 41.1 %
HEMOGLOBIN, POC: 13.4 G/DL
KETONES, URINE, POC: NEGATIVE
LDLC SERPL CALC-MCNC: 55 MG/DL (ref 0–100)
LEUKOCYTE ESTERASE, URINE, POC: ABNORMAL
LYMPHOCYTE %, POC: 39.7 %
LYMPHS ABSOLUTE, POC: 3.3 K/UL
MCH, POC: NORMAL PG (ref 40–?)
MCHC, POC: 32.6
MCV, POC: 92.8
MICROALBUMIN UR-MCNC: 1.54 MG/DL (ref 0–20)
MICROALBUMIN/CREAT UR-RTO: 17 MG/G (ref 0–30)
MONOCYTE %, POC: 7.7 %
MONOCYTE, ABSOLUTE POC: 0.6 K/UL
MPV, POC: 9.1 FL
NITRITE, URINE, POC: POSITIVE
PH, URINE, POC: 5.5 (ref 4.6–8)
PLATELET COUNT, POC: 256 K/UL
POTASSIUM SERPL-SCNC: 3.9 MMOL/L (ref 3.5–5.1)
PROT SERPL-MCNC: 7.5 G/DL (ref 6.3–8.2)
PROTEIN,URINE, POC: NEGATIVE
RBC, POC: 4.43 M/UL
RDW, POC: 13.1 %
SODIUM SERPL-SCNC: 142 MMOL/L (ref 136–145)
SPECIFIC GRAVITY, URINE, POC: 1.01 (ref 1–1.03)
TRIGL SERPL-MCNC: 274 MG/DL (ref 0–150)
TSH, 3RD GENERATION: 1.31 UIU/ML (ref 0.27–4.2)
URINALYSIS CLARITY, POC: ABNORMAL
URINALYSIS COLOR, POC: YELLOW
UROBILINOGEN, POC: NORMAL
VLDLC SERPL CALC-MCNC: 55 MG/DL (ref 6–23)
WBC, POC: 8.2 K/UL

## 2025-04-28 PROCEDURE — 3074F SYST BP LT 130 MM HG: CPT | Performed by: FAMILY MEDICINE

## 2025-04-28 PROCEDURE — 1123F ACP DISCUSS/DSCN MKR DOCD: CPT | Performed by: FAMILY MEDICINE

## 2025-04-28 PROCEDURE — 81003 URINALYSIS AUTO W/O SCOPE: CPT | Performed by: FAMILY MEDICINE

## 2025-04-28 PROCEDURE — 1159F MED LIST DOCD IN RCRD: CPT | Performed by: FAMILY MEDICINE

## 2025-04-28 PROCEDURE — 3078F DIAST BP <80 MM HG: CPT | Performed by: FAMILY MEDICINE

## 2025-04-28 PROCEDURE — 85025 COMPLETE CBC W/AUTO DIFF WBC: CPT | Performed by: FAMILY MEDICINE

## 2025-04-28 PROCEDURE — G0439 PPPS, SUBSEQ VISIT: HCPCS | Performed by: FAMILY MEDICINE

## 2025-04-28 RX ORDER — SIMVASTATIN 20 MG
20 TABLET ORAL NIGHTLY
Qty: 90 TABLET | Refills: 3 | Status: SHIPPED | OUTPATIENT
Start: 2025-04-28

## 2025-04-28 RX ORDER — LOSARTAN POTASSIUM AND HYDROCHLOROTHIAZIDE 25; 100 MG/1; MG/1
1 TABLET ORAL DAILY
Qty: 90 TABLET | Refills: 3 | Status: SHIPPED | OUTPATIENT
Start: 2025-04-28

## 2025-04-28 RX ORDER — BLOOD SUGAR DIAGNOSTIC
1 STRIP MISCELLANEOUS DAILY
Qty: 300 EACH | Refills: 3 | Status: SHIPPED | OUTPATIENT
Start: 2025-04-28

## 2025-04-28 RX ORDER — INSULIN GLARGINE 100 [IU]/ML
60 INJECTION, SOLUTION SUBCUTANEOUS NIGHTLY
Qty: 100 ML | Refills: 11 | Status: SHIPPED | OUTPATIENT
Start: 2025-04-28

## 2025-04-28 SDOH — ECONOMIC STABILITY: FOOD INSECURITY: WITHIN THE PAST 12 MONTHS, THE FOOD YOU BOUGHT JUST DIDN'T LAST AND YOU DIDN'T HAVE MONEY TO GET MORE.: NEVER TRUE

## 2025-04-28 SDOH — ECONOMIC STABILITY: FOOD INSECURITY: WITHIN THE PAST 12 MONTHS, YOU WORRIED THAT YOUR FOOD WOULD RUN OUT BEFORE YOU GOT MONEY TO BUY MORE.: NEVER TRUE

## 2025-04-28 ASSESSMENT — PATIENT HEALTH QUESTIONNAIRE - PHQ9
1. LITTLE INTEREST OR PLEASURE IN DOING THINGS: NOT AT ALL
SUM OF ALL RESPONSES TO PHQ QUESTIONS 1-9: 0
2. FEELING DOWN, DEPRESSED OR HOPELESS: NOT AT ALL

## 2025-04-28 NOTE — PROGRESS NOTES
Medicare Annual Wellness Visit    Radha Kelley is here for Medicare AWV    Assessment & Plan   Routine general medical examination at a health care facility  Pure hypercholesterolemia  -     simvastatin (ZOCOR) 20 MG tablet; Take 1 tablet by mouth nightly TOME 1 TABLETA CADA NOCHE PARA HIPERCOLESTEROLEMIA, Disp-90 tablet, R-3Normal  -     Lipid Panel  Type 2 diabetes mellitus with hyperglycemia, with long-term current use of insulin (HCC)  -     metFORMIN (GLUCOPHAGE) 1000 MG tablet; Take 1 tablet by mouth 2 times daily (with meals) TAKE ONE TABLET BY MOUTH TWICE A DAY WITH MEALS, Disp-180 tablet, R-3Normal  -     insulin glargine (LANTUS SOLOSTAR) 100 UNIT/ML injection pen; Inject 60 Units into the skin nightly, Disp-100 mL, R-11Normal  -     insulin aspart (NOVOLOG) 100 UNIT/ML injection pen; Patient will do 8 units in the morning with breakfast 12 units at lunch and 20 units with dinner, Disp-15 Adjustable Dose Pre-filled Pen Syringe, R-5Normal  -     blood glucose test strips (ACCU-CHEK DANIEL PLUS) strip; 1 each by In Vitro route daily As needed., Disp-300 each, R-3Normal  -     dulaglutide (TRULICITY) 1.5 MG/0.5ML SC injection; Inject 0.5 mLs into the skin once a week, Disp-15 Adjustable Dose Pre-filled Pen Syringe, R-3Normal  -     Albumin/Creatinine Ratio, Urine  -     Hemoglobin A1C  -     TSH  Essential hypertension with goal blood pressure less than 140/90  -     losartan-hydroCHLOROthiazide (HYZAAR) 100-25 MG per tablet; Take 1 tablet by mouth daily, Disp-90 tablet, R-3Normal  -     AMB POC KTY COMPLETE CBC  -     Comprehensive Metabolic Panel  Dysuria  -     AMB POC URINALYSIS DIP STICK AUTO W/O MICRO  Medicare annual wellness visit, subsequent  Screening for depression  Vitamin D deficiency, unspecified  Postmenopausal  -     Vitamin D 25 Hydroxy; Future  Low vitamin D level  -     Vitamin D 25 Hydroxy; Future  Vitamin D deficiency  -     Vitamin D 25 Hydroxy; Future       No follow-ups on file.

## 2025-04-28 NOTE — PATIENT INSTRUCTIONS
Learning About Being Active as an Older Adult  Why is being active important as you get older?     Being active is one of the best things you can do for your health. And it's never too late to start. Being active--or getting active, if you aren't already--has definite benefits. It can:  Give you more energy,  Keep your mind sharp.  Improve balance to reduce your risk of falls.  Help you manage chronic illness with fewer medicines.  No matter how old you are, how fit you are, or what health problems you have, there is a form of activity that will work for you. And the more physical activity you can do, the better your overall health will be.  What kinds of activity can help you stay healthy?  Being more active will make your daily activities easier. Physical activity includes planned exercise and things you do in daily life. There are four types of activity:  Aerobic.  Doing aerobic activity makes your heart and lungs strong.  Includes walking, dancing, and gardening.  Aim for at least 2½ hours spread throughout the week.  It improves your energy and can help you sleep better.  Muscle-strengthening.  This type of activity can help maintain muscle and strengthen bones.  Includes climbing stairs, using resistance bands, and lifting or carrying heavy loads.  Aim for at least twice a week.  It can help protect the knees and other joints.  Stretching.  Stretching gives you better range of motion in joints and muscles.  Includes upper arm stretches, calf stretches, and gentle yoga.  Aim for at least twice a week, preferably after your muscles are warmed up from other activities.  It can help you function better in daily life.  Balancing.  This helps you stay coordinated and have good posture.  Includes heel-to-toe walking, joseph chi, and certain types of yoga.  Aim for at least 3 days a week.  It can reduce your risk of falling.  Even if you have a hard time meeting the recommendations, it's better to be more active

## 2025-05-12 ENCOUNTER — OFFICE VISIT (OUTPATIENT)
Dept: FAMILY MEDICINE CLINIC | Facility: CLINIC | Age: 80
End: 2025-05-12
Payer: MEDICARE

## 2025-05-12 VITALS
HEIGHT: 60 IN | DIASTOLIC BLOOD PRESSURE: 74 MMHG | SYSTOLIC BLOOD PRESSURE: 126 MMHG | HEART RATE: 83 BPM | OXYGEN SATURATION: 95 % | WEIGHT: 144 LBS | BODY MASS INDEX: 28.27 KG/M2

## 2025-05-12 DIAGNOSIS — E11.65 TYPE 2 DIABETES MELLITUS WITH HYPERGLYCEMIA, WITH LONG-TERM CURRENT USE OF INSULIN (HCC): Primary | ICD-10-CM

## 2025-05-12 DIAGNOSIS — Z79.4 TYPE 2 DIABETES MELLITUS WITH HYPERGLYCEMIA, WITH LONG-TERM CURRENT USE OF INSULIN (HCC): Primary | ICD-10-CM

## 2025-05-12 DIAGNOSIS — E78.00 PURE HYPERCHOLESTEROLEMIA: ICD-10-CM

## 2025-05-12 DIAGNOSIS — I10 ESSENTIAL HYPERTENSION WITH GOAL BLOOD PRESSURE LESS THAN 140/90: ICD-10-CM

## 2025-05-12 PROCEDURE — 1159F MED LIST DOCD IN RCRD: CPT | Performed by: FAMILY MEDICINE

## 2025-05-12 PROCEDURE — 3078F DIAST BP <80 MM HG: CPT | Performed by: FAMILY MEDICINE

## 2025-05-12 PROCEDURE — 1123F ACP DISCUSS/DSCN MKR DOCD: CPT | Performed by: FAMILY MEDICINE

## 2025-05-12 PROCEDURE — 1090F PRES/ABSN URINE INCON ASSESS: CPT | Performed by: FAMILY MEDICINE

## 2025-05-12 PROCEDURE — 3074F SYST BP LT 130 MM HG: CPT | Performed by: FAMILY MEDICINE

## 2025-05-12 PROCEDURE — 1036F TOBACCO NON-USER: CPT | Performed by: FAMILY MEDICINE

## 2025-05-12 PROCEDURE — 99213 OFFICE O/P EST LOW 20 MIN: CPT | Performed by: FAMILY MEDICINE

## 2025-05-12 PROCEDURE — G8427 DOCREV CUR MEDS BY ELIG CLIN: HCPCS | Performed by: FAMILY MEDICINE

## 2025-05-12 PROCEDURE — 3046F HEMOGLOBIN A1C LEVEL >9.0%: CPT | Performed by: FAMILY MEDICINE

## 2025-05-12 PROCEDURE — G8399 PT W/DXA RESULTS DOCUMENT: HCPCS | Performed by: FAMILY MEDICINE

## 2025-05-12 PROCEDURE — G8417 CALC BMI ABV UP PARAM F/U: HCPCS | Performed by: FAMILY MEDICINE

## 2025-05-12 ASSESSMENT — ENCOUNTER SYMPTOMS
SHORTNESS OF BREATH: 0
VOMITING: 0
NAUSEA: 0

## 2025-05-12 NOTE — PROGRESS NOTES
PROGRESS NOTE    SUBJECTIVE:   Radha Kelley is a 79 y.o. female seen for a follow up visit regarding the following chief complaint:     Chief Complaint   Patient presents with    Follow-up     Labs follow up           HPI patient presents the office today for follow-up of her lab results without any new complaints      Past Medical History, Past Surgical History, Family history, Social History, and Medications were all reviewed with the patient today and updated as necessary.       Current Outpatient Medications   Medication Sig Dispense Refill    simvastatin (ZOCOR) 20 MG tablet Take 1 tablet by mouth nightly TOME 1 TABLETA CADA NOCHE PARA HIPERCOLESTEROLEMIA 90 tablet 3    metFORMIN (GLUCOPHAGE) 1000 MG tablet Take 1 tablet by mouth 2 times daily (with meals) TAKE ONE TABLET BY MOUTH TWICE A DAY WITH MEALS 180 tablet 3    losartan-hydroCHLOROthiazide (HYZAAR) 100-25 MG per tablet Take 1 tablet by mouth daily 90 tablet 3    insulin glargine (LANTUS SOLOSTAR) 100 UNIT/ML injection pen Inject 60 Units into the skin nightly 100 mL 11    insulin aspart (NOVOLOG) 100 UNIT/ML injection pen Patient will do 8 units in the morning with breakfast 12 units at lunch and 20 units with dinner 15 Adjustable Dose Pre-filled Pen Syringe 5    blood glucose test strips (ACCU-CHEK DANIEL PLUS) strip 1 each by In Vitro route daily As needed. 300 each 3    dulaglutide (TRULICITY) 1.5 MG/0.5ML SC injection Inject 0.5 mLs into the skin once a week 15 Adjustable Dose Pre-filled Pen Syringe 3    aspirin 81 MG EC tablet Take 1 tablet by mouth daily      Calcium Carb-Cholecalciferol 600-400 MG-UNIT CAPS 1 p.o. daily       No current facility-administered medications for this visit.     No Known Allergies  Patient Active Problem List   Diagnosis    Pure hypercholesterolemia    Essential hypertension with goal blood pressure less than 140/90    Type 2 diabetes mellitus with hyperglycemia, with long-term current use of insulin (HCC)     Past

## (undated) DEVICE — NDL PRT INJ NSAF BLNT 18GX1.5 --

## (undated) DEVICE — CONTAINER PREFIL FRMLN 40ML --

## (undated) DEVICE — SYR 5ML 1/5 GRAD LL NSAF LF --

## (undated) DEVICE — SNARE POLYP SM W13MMXL240CM SHTH DIA2.4MM OVL FLX DISP

## (undated) DEVICE — SYR 50ML SLIP TIP NSAF LF STRL --

## (undated) DEVICE — SYR 3ML LL TIP 1/10ML GRAD --

## (undated) DEVICE — CANNULA NSL ORAL AD FOR CAPNOFLEX CO2 O2 AIRLFE

## (undated) DEVICE — FCPS BX HOT RJ4 2.2MMX240CM -- RADIAL JAW 4 BX/40

## (undated) DEVICE — KENDALL RADIOLUCENT FOAM MONITORING ELECTRODE RECTANGULAR SHAPE: Brand: KENDALL

## (undated) DEVICE — CONNECTOR TBNG OD5-7MM O2 END DISP

## (undated) DEVICE — REM POLYHESIVE ADULT PATIENT RETURN ELECTRODE: Brand: VALLEYLAB